# Patient Record
Sex: MALE | Race: BLACK OR AFRICAN AMERICAN | NOT HISPANIC OR LATINO | Employment: FULL TIME | URBAN - METROPOLITAN AREA
[De-identification: names, ages, dates, MRNs, and addresses within clinical notes are randomized per-mention and may not be internally consistent; named-entity substitution may affect disease eponyms.]

---

## 2019-03-25 ENCOUNTER — LAB (OUTPATIENT)
Dept: LAB | Facility: HOSPITAL | Age: 29
End: 2019-03-25

## 2019-03-25 ENCOUNTER — OFFICE VISIT (OUTPATIENT)
Dept: FAMILY MEDICINE CLINIC | Facility: CLINIC | Age: 29
End: 2019-03-25

## 2019-03-25 ENCOUNTER — HOSPITAL ENCOUNTER (OUTPATIENT)
Dept: GENERAL RADIOLOGY | Facility: HOSPITAL | Age: 29
Discharge: HOME | End: 2019-03-25
Admitting: INTERNAL MEDICINE

## 2019-03-25 VITALS
HEIGHT: 60 IN | DIASTOLIC BLOOD PRESSURE: 80 MMHG | RESPIRATION RATE: 14 BRPM | HEART RATE: 78 BPM | TEMPERATURE: 98.4 F | OXYGEN SATURATION: 97 % | SYSTOLIC BLOOD PRESSURE: 126 MMHG | BODY MASS INDEX: 27.17 KG/M2 | WEIGHT: 138.4 LBS

## 2019-03-25 DIAGNOSIS — R63.0 LACK OF APPETITE: Primary | ICD-10-CM

## 2019-03-25 DIAGNOSIS — M54.50 ACUTE MIDLINE LOW BACK PAIN WITHOUT SCIATICA: ICD-10-CM

## 2019-03-25 DIAGNOSIS — Z00.00 PREVENTATIVE HEALTH CARE: ICD-10-CM

## 2019-03-25 DIAGNOSIS — G89.29 CHRONIC PAIN OF RIGHT KNEE: ICD-10-CM

## 2019-03-25 DIAGNOSIS — M25.561 CHRONIC PAIN OF RIGHT KNEE: ICD-10-CM

## 2019-03-25 DIAGNOSIS — Z86.39 HISTORY OF IRON DEFICIENCY: ICD-10-CM

## 2019-03-25 LAB
25(OH)D3 SERPL-MCNC: 14.7 NG/ML
ALBUMIN SERPL-MCNC: 4.88 G/DL (ref 3.2–4.8)
ALBUMIN/GLOB SERPL: 2.4 G/DL (ref 1.5–2.5)
ALP SERPL-CCNC: 110 U/L (ref 25–100)
ALT SERPL W P-5'-P-CCNC: 17 U/L (ref 7–40)
ANION GAP SERPL CALCULATED.3IONS-SCNC: 6 MMOL/L (ref 3–11)
ARTICHOKE IGE QN: 109 MG/DL (ref 0–130)
AST SERPL-CCNC: 17 U/L (ref 0–33)
BILIRUB SERPL-MCNC: 1.1 MG/DL (ref 0.3–1.2)
BUN BLD-MCNC: 15 MG/DL (ref 9–23)
BUN/CREAT SERPL: 15 (ref 7–25)
CALCIUM SPEC-SCNC: 9.7 MG/DL (ref 8.7–10.4)
CHLORIDE SERPL-SCNC: 104 MMOL/L (ref 99–109)
CHOLEST SERPL-MCNC: 172 MG/DL (ref 0–200)
CO2 SERPL-SCNC: 30 MMOL/L (ref 20–31)
CREAT BLD-MCNC: 1 MG/DL (ref 0.6–1.3)
DEPRECATED RDW RBC AUTO: 44.4 FL (ref 37–54)
ERYTHROCYTE [DISTWIDTH] IN BLOOD BY AUTOMATED COUNT: 13.4 % (ref 11.3–14.5)
GFR SERPL CREATININE-BSD FRML MDRD: 109 ML/MIN/1.73
GLOBULIN UR ELPH-MCNC: 2 GM/DL
GLUCOSE BLD-MCNC: 95 MG/DL (ref 70–100)
HCT VFR BLD AUTO: 49.1 % (ref 38.9–50.9)
HDLC SERPL-MCNC: 55 MG/DL (ref 40–60)
HGB BLD-MCNC: 16.2 G/DL (ref 13.1–17.5)
IRON 24H UR-MRATE: 101 MCG/DL (ref 50–175)
IRON SATN MFR SERPL: 33 % (ref 20–50)
MCH RBC QN AUTO: 30.2 PG (ref 27–31)
MCHC RBC AUTO-ENTMCNC: 33 G/DL (ref 32–36)
MCV RBC AUTO: 91.4 FL (ref 80–99)
PLATELET # BLD AUTO: 192 10*3/MM3 (ref 150–450)
PMV BLD AUTO: 11.1 FL (ref 6–12)
POTASSIUM BLD-SCNC: 4.7 MMOL/L (ref 3.5–5.5)
PROT SERPL-MCNC: 6.9 G/DL (ref 5.7–8.2)
RBC # BLD AUTO: 5.37 10*6/MM3 (ref 4.2–5.76)
SODIUM BLD-SCNC: 140 MMOL/L (ref 132–146)
TIBC SERPL-MCNC: 310 MCG/DL (ref 250–450)
TRIGL SERPL-MCNC: 69 MG/DL (ref 0–150)
TSH SERPL DL<=0.05 MIU/L-ACNC: 0.37 MIU/ML (ref 0.35–5.35)
WBC NRBC COR # BLD: 6.14 10*3/MM3 (ref 3.5–10.8)

## 2019-03-25 PROCEDURE — 83550 IRON BINDING TEST: CPT | Performed by: INTERNAL MEDICINE

## 2019-03-25 PROCEDURE — 82306 VITAMIN D 25 HYDROXY: CPT | Performed by: INTERNAL MEDICINE

## 2019-03-25 PROCEDURE — 99204 OFFICE O/P NEW MOD 45 MIN: CPT | Performed by: INTERNAL MEDICINE

## 2019-03-25 PROCEDURE — 85027 COMPLETE CBC AUTOMATED: CPT | Performed by: INTERNAL MEDICINE

## 2019-03-25 PROCEDURE — 73560 X-RAY EXAM OF KNEE 1 OR 2: CPT

## 2019-03-25 PROCEDURE — 80053 COMPREHEN METABOLIC PANEL: CPT | Performed by: INTERNAL MEDICINE

## 2019-03-25 PROCEDURE — 84443 ASSAY THYROID STIM HORMONE: CPT | Performed by: INTERNAL MEDICINE

## 2019-03-25 PROCEDURE — 83540 ASSAY OF IRON: CPT | Performed by: INTERNAL MEDICINE

## 2019-03-25 PROCEDURE — 36415 COLL VENOUS BLD VENIPUNCTURE: CPT

## 2019-03-25 PROCEDURE — 80061 LIPID PANEL: CPT | Performed by: INTERNAL MEDICINE

## 2019-03-25 NOTE — PROGRESS NOTES
Chief Complaint:  Appetite, knee pain, back pain    HPI:  Sonny Goel is a 28 y.o. male who presents today for establish care and several acute complaints.  Patient reports lack of appetite over the last several months.  He has been trying to gain weight.  Plays basketball frequently and works out.  Usually 4 times per week.  He has no appetite the morning has difficulty eating large meals.  Reports sometimes nauseous when he tries to eat.  Reports injuring his knee playing basketball several months ago.  He denies any recurrent pain but states he has not sensation in his knee when he runs out.  He reports back pain since that time as well.The right-sided back pain worse with physical activity.  No better for the last couple months.  He has tried conservative therapy with over-the-counter medication.  He does not remember any specific muscle strain or his back.    ROS:  Constitutional: no fevers, night sweats or unexplained weight loss  Eyes: no vision changes  ENT: no runny nose, ear pain, sore throat  Cardio: no chest pain, palpitations  Pulm: no shortness of breath, wheezing, or cough  GI: no abdominal pain or changes in bowel movements  : no difficulty urinating  MSK: no difficulty ambulating, no joint pain  Neuro: no weakness, dizziness or headache  Psych: no trouble sleeping  Endo: Lack of appetite      Past Medical History:   Diagnosis Date   • Anemia     low iron       Family History   Problem Relation Age of Onset   • Breast cancer Neg Hx    • Diabetes Neg Hx    • Heart attack Neg Hx    • Hypertension Neg Hx    • Migraines Neg Hx    • Stroke Neg Hx       Social History     Socioeconomic History   • Marital status: Single     Spouse name: Not on file   • Number of children: Not on file   • Years of education: Not on file   • Highest education level: Not on file   Tobacco Use   • Smoking status: Never Smoker   • Smokeless tobacco: Never Used   Substance and Sexual Activity   • Alcohol use: Yes      Comment: once monthly    • Drug use: No   • Sexual activity: Yes     Partners: Female     Birth control/protection: Condom      No Known Allergies     There is no immunization history on file for this patient.     PE:  Vitals:    03/25/19 1051   BP: 126/80   Pulse: 78   Resp: 14   Temp: 98.4 °F (36.9 °C)   SpO2: 97%        Gen Appearance: NAD  HEENT: Normocephalic, PERRLA, no thyromegaly, trache midline  Heart: RRR, normal S1 and S2, no murmur  Lungs: CTA b/l, no wheezing, no crackles  Abdomen: Soft, non-tender, non-distended, no guarding and BSx4  MSK: Moves all extremities well, normal gait, no peripheral edema, crepitus right knee, negative drawer test bilaterally, no pain to palpation bilateral knees  Pulses: Palpable and equal b/l  Lymph nodes: No palpable lymphadenopathy   Neuro: No focal deficits      No current outpatient medications on file.     No current facility-administered medications for this visit.         Sonny was seen today for establish care.    Diagnoses and all orders for this visit:    Lack of appetite  Checking blood work.  If all normal will refer to nutritionist.  Preventative health care  -     CBC (No Diff); Future  -     Comprehensive Metabolic Panel; Future  -     Lipid Panel; Future  -     Vitamin D 25 Hydroxy; Future  -     TSH Rfx On Abnormal To Free T4; Future    History of iron deficiency  -     Iron and TIBC; Future  History of iron deficiency previously prescribed iron supplements.  He currently takes these intermittently.  Has not had blood work done in many years.  Chronic pain of right knee  -     XR Knee 1 or 2 View Right; Future  Checking x-ray of the knee with trauma approximately 1 month ago.  Crepitus on exam but otherwise normal.  Full range of motion and no pain with activity.  Acute midline low back pain without sciatica  -     Ambulatory Referral to Physical Therapy Evaluate and treat  Will refer to PT for ongoing back pain.  No sciatica.  Appears to be  musculoskeletal right-sided low back pain based on exam and presentation.       Return in about 1 year (around 3/25/2020).

## 2020-02-13 ENCOUNTER — HOSPITAL ENCOUNTER (EMERGENCY)
Facility: HOSPITAL | Age: 30
Discharge: HOME | End: 2020-02-14
Attending: EMERGENCY MEDICINE | Admitting: EMERGENCY MEDICINE

## 2020-02-13 DIAGNOSIS — R11.2 NON-INTRACTABLE VOMITING WITH NAUSEA, UNSPECIFIED VOMITING TYPE: ICD-10-CM

## 2020-02-13 DIAGNOSIS — J10.1 INFLUENZA A: Primary | ICD-10-CM

## 2020-02-13 PROCEDURE — 86308 HETEROPHILE ANTIBODY SCREEN: CPT | Performed by: EMERGENCY MEDICINE

## 2020-02-13 PROCEDURE — 85025 COMPLETE CBC W/AUTO DIFF WBC: CPT | Performed by: EMERGENCY MEDICINE

## 2020-02-13 PROCEDURE — 80053 COMPREHEN METABOLIC PANEL: CPT | Performed by: EMERGENCY MEDICINE

## 2020-02-13 PROCEDURE — 87804 INFLUENZA ASSAY W/OPTIC: CPT | Performed by: EMERGENCY MEDICINE

## 2020-02-13 PROCEDURE — 99283 EMERGENCY DEPT VISIT LOW MDM: CPT

## 2020-02-13 PROCEDURE — 83690 ASSAY OF LIPASE: CPT | Performed by: EMERGENCY MEDICINE

## 2020-02-13 PROCEDURE — 96374 THER/PROPH/DIAG INJ IV PUSH: CPT

## 2020-02-13 RX ORDER — ONDANSETRON 2 MG/ML
4 INJECTION INTRAMUSCULAR; INTRAVENOUS
Status: DISCONTINUED | OUTPATIENT
Start: 2020-02-13 | End: 2020-02-14 | Stop reason: HOSPADM

## 2020-02-13 RX ORDER — SODIUM CHLORIDE 0.9 % (FLUSH) 0.9 %
10 SYRINGE (ML) INJECTION AS NEEDED
Status: DISCONTINUED | OUTPATIENT
Start: 2020-02-13 | End: 2020-02-14 | Stop reason: HOSPADM

## 2020-02-14 VITALS
HEIGHT: 72 IN | DIASTOLIC BLOOD PRESSURE: 76 MMHG | BODY MASS INDEX: 18.28 KG/M2 | RESPIRATION RATE: 18 BRPM | HEART RATE: 81 BPM | WEIGHT: 135 LBS | TEMPERATURE: 99.8 F | SYSTOLIC BLOOD PRESSURE: 119 MMHG | OXYGEN SATURATION: 97 %

## 2020-02-14 LAB
ALBUMIN SERPL-MCNC: 4.3 G/DL (ref 3.5–5.2)
ALBUMIN/GLOB SERPL: 1.3 G/DL
ALP SERPL-CCNC: 69 U/L (ref 39–117)
ALT SERPL W P-5'-P-CCNC: 12 U/L (ref 1–41)
ANION GAP SERPL CALCULATED.3IONS-SCNC: 12 MMOL/L (ref 5–15)
AST SERPL-CCNC: 21 U/L (ref 1–40)
BASOPHILS # BLD AUTO: 0.01 10*3/MM3 (ref 0–0.2)
BASOPHILS NFR BLD AUTO: 0.2 % (ref 0–1.5)
BILIRUB SERPL-MCNC: 0.5 MG/DL (ref 0.2–1.2)
BUN BLD-MCNC: 10 MG/DL (ref 6–20)
BUN/CREAT SERPL: 10.4 (ref 7–25)
CALCIUM SPEC-SCNC: 9.3 MG/DL (ref 8.6–10.5)
CHLORIDE SERPL-SCNC: 101 MMOL/L (ref 98–107)
CO2 SERPL-SCNC: 25 MMOL/L (ref 22–29)
CREAT BLD-MCNC: 0.96 MG/DL (ref 0.76–1.27)
DEPRECATED RDW RBC AUTO: 40 FL (ref 37–54)
EOSINOPHIL # BLD AUTO: 0.14 10*3/MM3 (ref 0–0.4)
EOSINOPHIL NFR BLD AUTO: 2.3 % (ref 0.3–6.2)
ERYTHROCYTE [DISTWIDTH] IN BLOOD BY AUTOMATED COUNT: 12.3 % (ref 12.3–15.4)
FLUAV AG NPH QL: POSITIVE
FLUBV AG NPH QL IA: NEGATIVE
GFR SERPL CREATININE-BSD FRML MDRD: 114 ML/MIN/1.73
GLOBULIN UR ELPH-MCNC: 3.4 GM/DL
GLUCOSE BLD-MCNC: 106 MG/DL (ref 65–99)
HCT VFR BLD AUTO: 48.6 % (ref 37.5–51)
HETEROPH AB SER QL LA: NEGATIVE
HGB BLD-MCNC: 16.1 G/DL (ref 13–17.7)
IMM GRANULOCYTES # BLD AUTO: 0.02 10*3/MM3 (ref 0–0.05)
IMM GRANULOCYTES NFR BLD AUTO: 0.3 % (ref 0–0.5)
LIPASE SERPL-CCNC: 16 U/L (ref 13–60)
LYMPHOCYTES # BLD AUTO: 0.7 10*3/MM3 (ref 0.7–3.1)
LYMPHOCYTES NFR BLD AUTO: 11.6 % (ref 19.6–45.3)
MCH RBC QN AUTO: 29.4 PG (ref 26.6–33)
MCHC RBC AUTO-ENTMCNC: 33.1 G/DL (ref 31.5–35.7)
MCV RBC AUTO: 88.8 FL (ref 79–97)
MONOCYTES # BLD AUTO: 0.8 10*3/MM3 (ref 0.1–0.9)
MONOCYTES NFR BLD AUTO: 13.2 % (ref 5–12)
NEUTROPHILS # BLD AUTO: 4.38 10*3/MM3 (ref 1.7–7)
NEUTROPHILS NFR BLD AUTO: 72.4 % (ref 42.7–76)
NRBC BLD AUTO-RTO: 0 /100 WBC (ref 0–0.2)
PLATELET # BLD AUTO: 142 10*3/MM3 (ref 140–450)
PMV BLD AUTO: 10.3 FL (ref 6–12)
POTASSIUM BLD-SCNC: 3.7 MMOL/L (ref 3.5–5.2)
PROT SERPL-MCNC: 7.7 G/DL (ref 6–8.5)
RBC # BLD AUTO: 5.47 10*6/MM3 (ref 4.14–5.8)
SODIUM BLD-SCNC: 138 MMOL/L (ref 136–145)
WBC NRBC COR # BLD: 6.05 10*3/MM3 (ref 3.4–10.8)

## 2020-02-14 PROCEDURE — 25010000002 ONDANSETRON PER 1 MG: Performed by: EMERGENCY MEDICINE

## 2020-02-14 PROCEDURE — 96374 THER/PROPH/DIAG INJ IV PUSH: CPT

## 2020-02-14 PROCEDURE — 93005 ELECTROCARDIOGRAM TRACING: CPT

## 2020-02-14 RX ORDER — IBUPROFEN 600 MG/1
600 TABLET ORAL EVERY 6 HOURS PRN
Qty: 20 TABLET | Refills: 0 | Status: SHIPPED | OUTPATIENT
Start: 2020-02-14 | End: 2022-02-21

## 2020-02-14 RX ORDER — ONDANSETRON 4 MG/1
4 TABLET, ORALLY DISINTEGRATING ORAL 4 TIMES DAILY PRN
Qty: 15 TABLET | Refills: 0 | Status: SHIPPED | OUTPATIENT
Start: 2020-02-14 | End: 2021-06-23

## 2020-02-14 RX ADMIN — SODIUM CHLORIDE 1000 ML: 9 INJECTION, SOLUTION INTRAVENOUS at 00:00

## 2020-02-14 RX ADMIN — ONDANSETRON 4 MG: 2 INJECTION INTRAMUSCULAR; INTRAVENOUS at 00:01

## 2020-02-14 NOTE — ED PROVIDER NOTES
EMERGENCY DEPARTMENT ENCOUNTER      Pt Name: Sonny Goel  MRN: [id number]  YOB: 1990  Date of evaluation: 2/13/2020  Provider: Aureliano Romero DO    CHIEF COMPLAINT       Chief Complaint   Patient presents with   • Vomiting         HISTORY OF PRESENT ILLNESS  (Location/Symptom, Timing/Onset, Context/Setting, Quality, Duration, Modifying Factors, Severity.)   Sonny Goel is a 29 y.o. male who presents to the emergency department complaining of flu-like symptoms that began two days prior to arrival. The patient endorses vomiting, nausea, loss of appetite, body aches, chills, fever, generalized weakness, fatigue, cough, headache, and chest pain. He denies diarrhea. The patient reports only vomiting once today because he has not been eating much. Mr. Goel did not receive the influenza vaccine this year. He endorses sick contacts at his workplace. He takes no daily medication. He denies smoking and occasionally drinks alcohol. Mr. Goel lists no other acute symptoms at this time.     Nursing notes were reviewed.    REVIEW OF SYSTEMS    (2-9 systems for level 4, 10 or more for level 5)   ROS:  General:  +Body aches, +chills, +fever, +generalized weakness, +fatigue, +decreased appetite  Cardiovascular: No palpitations  Respiratory:  +Cough, no wheezing  Gastrointestinal:  No pain, +nausea, +vomiting, no diarrhea  Musculoskeletal:  No muscle pain, no joint pain, +chest pain  Skin:  No rash, no easy bruising  Neurologic:  No speech problems, no headache, no extremity numbness, no extremity tingling, no extremity weakness  Psychiatric:  No anxiety  Genitourinary:  No dysuria, no hematuria    Except as noted above the remainder of the review of systems was reviewed and negative.       PAST MEDICAL HISTORY     Past Medical History:   Diagnosis Date   • Anemia     low iron          SURGICAL HISTORY     History reviewed. No pertinent surgical history.      CURRENT MEDICATIONS    "    Current Facility-Administered Medications:   •  ondansetron (ZOFRAN) injection 4 mg, 4 mg, Intravenous, Q30 Min PRN, Aureliano Romero DO, 4 mg at 02/14/20 0001  •  [COMPLETED] Insert peripheral IV, , , Once **AND** sodium chloride 0.9 % flush 10 mL, 10 mL, Intravenous, PRN, Aureliano Romero DO    Current Outpatient Medications:   •  ibuprofen (ADVIL,MOTRIN) 600 MG tablet, Take 1 tablet by mouth Every 6 (Six) Hours As Needed for Mild Pain ., Disp: 20 tablet, Rfl: 0  •  ondansetron ODT (ZOFRAN-ODT) 4 MG disintegrating tablet, Take 1 tablet by mouth 4 (Four) Times a Day As Needed for Nausea or Vomiting., Disp: 15 tablet, Rfl: 0    ALLERGIES     Patient has no known allergies.    FAMILY HISTORY       Family History   Problem Relation Age of Onset   • Breast cancer Neg Hx    • Diabetes Neg Hx    • Heart attack Neg Hx    • Hypertension Neg Hx    • Migraines Neg Hx    • Stroke Neg Hx           SOCIAL HISTORY       Social History     Socioeconomic History   • Marital status: Single     Spouse name: Not on file   • Number of children: Not on file   • Years of education: Not on file   • Highest education level: Not on file   Tobacco Use   • Smoking status: Never Smoker   • Smokeless tobacco: Never Used   Substance and Sexual Activity   • Alcohol use: Yes     Comment: socially   • Drug use: No   • Sexual activity: Yes     Partners: Female     Birth control/protection: Condom         PHYSICAL EXAM    (up to 7 for level 4, 8 or more for level 5)     Vitals:    02/13/20 2155 02/14/20 0000   BP: 134/86 125/76   BP Location: Left arm    Patient Position: Sitting    Pulse: 112 87   Resp: 18    Temp: 99.8 °F (37.7 °C)    TempSrc: Oral    SpO2: 96% 97%   Weight: 61.2 kg (135 lb)    Height: 182.9 cm (72\")        Physical Exam  General :Patient is awake, alert, oriented, in no acute distress, nontoxic appearing  HEENT: Pupils are equally round and reactive to light, EOMI, conjunctivae clear, sclerae white, there is no " injection no icterus.  Oral mucosa is moist, no exudate. Uvula is midline  Neck: Neck is supple, full range of motion, trachea midline  Cardiac: Heart regular rate, rhythm, no murmurs, rubs, or gallops  Lungs: Lungs are clear to auscultation, there is no wheezing, rhonchi, or rales. There is no use of accessory muscles.  Chest wall: There is no tenderness to palpation over the chest wall or over ribs  Abdomen: Abdomen is soft, nontender, nondistended. There is no firm or pulsatile masses, no rebound rigidity or guarding.   Musculoskeletal: 5 out of 5 strength in all 4 extremities.  No focal muscle deficits are appreciated  Neuro: Motor intact, sensory intact, level of consciousness is normal, GCS 15, no meningeal signs  Dermatology: Skin is warm and dry, no rash noted  Psych: Mentation is grossly normal, cognition is grossly normal. Affect is appropriate.      DIAGNOSTIC RESULTS     EKG: All EKG's are interpreted by the Emergency Department Physician who either signs or Co-signs this chart in the absence of a cardiologist.    ECG 12 Lead   Final Result   Test Reason : chest pain   Blood Pressure : **/** mmHG   Vent. Rate : 077 BPM     Atrial Rate : 077 BPM      P-R Int : 144 ms          QRS Dur : 090 ms       QT Int : 356 ms       P-R-T Axes : 082 098 069 degrees      QTc Int : 402 ms      Normal sinus rhythm   Rightward axis   Borderline ECG   No previous ECGs available   Confirmed by DENYS READ MD ([id number]) on 2/14/2020 12:41:13 AM      Referred By:  edmd           Confirmed By:DENYS READ MD          RADIOLOGY:   Non-plain film images such as CT, Ultrasound and MRI are read by the radiologist. Plain radiographic images are visualized and preliminarily interpreted by the emergency physician with the below findings:      [] Radiologist's Report Reviewed:  No orders to display         ED BEDSIDE ULTRASOUND:   Performed by ED Physician - none    LABS:    I have reviewed and interpreted all of the currently  available lab results from this visit (if applicable):  Results for orders placed or performed during the hospital encounter of 02/13/20   Influenza Antigen, Rapid - Swab, Nasopharynx   Result Value Ref Range    Influenza A Ag, EIA Positive (A) Negative    Influenza B Ag, EIA Negative Negative   Comprehensive Metabolic Panel   Result Value Ref Range    Glucose 106 (H) 65 - 99 mg/dL    BUN 10 6 - 20 mg/dL    Creatinine 0.96 0.76 - 1.27 mg/dL    Sodium 138 136 - 145 mmol/L    Potassium 3.7 3.5 - 5.2 mmol/L    Chloride 101 98 - 107 mmol/L    CO2 25.0 22.0 - 29.0 mmol/L    Calcium 9.3 8.6 - 10.5 mg/dL    Total Protein 7.7 6.0 - 8.5 g/dL    Albumin 4.30 3.50 - 5.20 g/dL    ALT (SGPT) 12 1 - 41 U/L    AST (SGOT) 21 1 - 40 U/L    Alkaline Phosphatase 69 39 - 117 U/L    Total Bilirubin 0.5 0.2 - 1.2 mg/dL    eGFR  African Amer 114 >60 mL/min/1.73    Globulin 3.4 gm/dL    A/G Ratio 1.3 g/dL    BUN/Creatinine Ratio 10.4 7.0 - 25.0    Anion Gap 12.0 5.0 - 15.0 mmol/L   Lipase   Result Value Ref Range    Lipase 16 13 - 60 U/L   Mononucleosis Screen   Result Value Ref Range    Monospot Negative Negative   CBC Auto Differential   Result Value Ref Range    WBC 6.05 3.40 - 10.80 10*3/mm3    RBC 5.47 4.14 - 5.80 10*6/mm3    Hemoglobin 16.1 13.0 - 17.7 g/dL    Hematocrit 48.6 37.5 - 51.0 %    MCV 88.8 79.0 - 97.0 fL    MCH 29.4 26.6 - 33.0 pg    MCHC 33.1 31.5 - 35.7 g/dL    RDW 12.3 12.3 - 15.4 %    RDW-SD 40.0 37.0 - 54.0 fl    MPV 10.3 6.0 - 12.0 fL    Platelets 142 140 - 450 10*3/mm3    Neutrophil % 72.4 42.7 - 76.0 %    Lymphocyte % 11.6 (L) 19.6 - 45.3 %    Monocyte % 13.2 (H) 5.0 - 12.0 %    Eosinophil % 2.3 0.3 - 6.2 %    Basophil % 0.2 0.0 - 1.5 %    Immature Grans % 0.3 0.0 - 0.5 %    Neutrophils, Absolute 4.38 1.70 - 7.00 10*3/mm3    Lymphocytes, Absolute 0.70 0.70 - 3.10 10*3/mm3    Monocytes, Absolute 0.80 0.10 - 0.90 10*3/mm3    Eosinophils, Absolute 0.14 0.00 - 0.40 10*3/mm3    Basophils, Absolute 0.01 0.00 - 0.20  "10*3/mm3    Immature Grans, Absolute 0.02 0.00 - 0.05 10*3/mm3    nRBC 0.0 0.0 - 0.2 /100 WBC        All other labs were within normal range or not returned as of this dictation.      EMERGENCY DEPARTMENT COURSE and DIFFERENTIAL DIAGNOSIS/MDM:   Vitals:    Vitals:    02/13/20 2155 02/14/20 0000   BP: 134/86 125/76   BP Location: Left arm    Patient Position: Sitting    Pulse: 112 87   Resp: 18    Temp: 99.8 °F (37.7 °C)    TempSrc: Oral    SpO2: 96% 97%   Weight: 61.2 kg (135 lb)    Height: 182.9 cm (72\")         !    Patient with viral type prodrome her last couple days.  Initially tachycardic, low-grade temperature on arrival, no meningeal signs.  Patient was given IV, fluids, influenza A is positive.  I discussed these results with the patient, he is feeling better, heart rate is improved after IV fluids.  We discussed symptomatic therapies, discussed pros and cons of Tamiflu medication, patient went to avoid it at this time.  We discussed continuing with symptomatic therapies, good fluid hydration, droplet precautions, close follow-up with his PCP. The patient will follow-up with their PCP in 1-2 days for reevaluation.  If the patient or family members have any further concerns or patient has any worsening symptoms they will return to the ED for reevaluation.      MEDICATIONS ADMINISTERED IN ED:  Medications   sodium chloride 0.9 % flush 10 mL (has no administration in time range)   ondansetron (ZOFRAN) injection 4 mg (4 mg Intravenous Given 2/14/20 0001)   sodium chloride 0.9 % bolus 1,000 mL (0 mL Intravenous Stopped 2/14/20 0043)       PROCEDURES:  Procedures    CRITICAL CARE TIME    Total Critical Care time was 0 minutes, excluding separately reportable procedures.   There was a high probability of clinically significant/life threatening deterioration in the patient's condition which required my urgent intervention.      FINAL IMPRESSION      1. Influenza A    2. Non-intractable vomiting with nausea, " unspecified vomiting type          DISPOSITION/PLAN     ED Disposition     ED Disposition Condition Comment    Discharge Stable           PATIENT REFERRED TO:  Mateo Barrera DO  [address] [address]  [phone]    In 2 days      [facility]  [address] [facility] [address] [phone]    If symptoms worsen      DISCHARGE MEDICATIONS:     Medication List      START taking these medications    ibuprofen 600 MG tablet  Commonly known as:  ADVIL,MOTRIN  Take 1 tablet by mouth Every 6 (Six) Hours As Needed for Mild Pain .     ondansetron ODT 4 MG disintegrating tablet  Commonly known as:  ZOFRAN-ODT  Take 1 tablet by mouth 4 (Four) Times a Day As Needed for Nausea or   Vomiting.            Documentation assistance provided by Scar Molina acting as scribe for Dr. Aureliano Romero.     The scribe's documentation has been prepared under my direction and personally reviewed by me in its entirety.  I confirm that the note above accurately reflects all work, treatment, procedures, and medical decision making performed by me.      Comment: Please note this report has been produced using speech recognition software.      Aureliano Romero DO  Attending Emergency Physician                 Scar Molina  02/14/20 0013       Aureliano Romero DO  02/14/20 0055

## 2020-07-29 ENCOUNTER — OFFICE VISIT (OUTPATIENT)
Dept: ORTHOPEDIC SURGERY | Facility: CLINIC | Age: 30
End: 2020-07-29

## 2020-07-29 VITALS — WEIGHT: 148.2 LBS | BODY MASS INDEX: 20.07 KG/M2 | HEART RATE: 85 BPM | OXYGEN SATURATION: 99 % | HEIGHT: 72 IN

## 2020-07-29 DIAGNOSIS — M79.604 RIGHT LEG PAIN: Primary | ICD-10-CM

## 2020-07-29 DIAGNOSIS — S76.111A STRAIN OF RIGHT QUADRICEPS, INITIAL ENCOUNTER: ICD-10-CM

## 2020-07-29 PROCEDURE — 99203 OFFICE O/P NEW LOW 30 MIN: CPT | Performed by: ORTHOPAEDIC SURGERY

## 2020-07-29 NOTE — PROGRESS NOTES
[facility] Note    Subjective     Chief Complaint   Patient presents with   • Right Leg - Pain        HPI    Sonny Goel is a 29 y.o. male who presents with right femur pain.  Onset: injured while playing soccer. The issue has been ongoing for 3 day(s). Pain is a 9/10 on the pain scale. Pain is described as aching, throbbing, stabbing and shooting. Associated symptoms include pain, swelling and stiffness. The pain is worse with bending the knee; resting and ice improve the pain. Previous treatments have included: NSAIDS.    I have reviewed the following portions of the patient's history:History of Present Illness    He injured his right quadriceps muscle playing soccer 3 days ago.  He works at [employer].      Past Medical History:   Diagnosis Date   • Anemia     low iron       History reviewed. No pertinent surgical history.   Family History   Problem Relation Age of Onset   • Breast cancer Neg Hx    • Diabetes Neg Hx    • Heart attack Neg Hx    • Hypertension Neg Hx    • Migraines Neg Hx    • Stroke Neg Hx      Social History     Socioeconomic History   • Marital status: Single     Spouse name: Not on file   • Number of children: Not on file   • Years of education: Not on file   • Highest education level: Not on file   Tobacco Use   • Smoking status: Never Smoker   • Smokeless tobacco: Never Used   Substance and Sexual Activity   • Alcohol use: Yes     Comment: socially   • Drug use: No   • Sexual activity: Yes     Partners: Female     Birth control/protection: Condom      Current Outpatient Medications on File Prior to Visit   Medication Sig Dispense Refill   • ibuprofen (ADVIL,MOTRIN) 600 MG tablet Take 1 tablet by mouth Every 6 (Six) Hours As Needed for Mild Pain . 20 tablet 0   • ondansetron ODT (ZOFRAN-ODT) 4 MG disintegrating tablet Take 1 tablet by mouth 4 (Four) Times a Day As Needed for Nausea or Vomiting. 15 tablet 0     No current facility-administered medications on file  "prior to visit.       No Known Allergies     The following portions of the patient's history were reviewed and updated as appropriate: allergies, current medications, past family history, past medical history, past social history, past surgical history and problem list.    Review of Systems   Constitutional: Negative.    HENT: Negative.    Eyes: Negative.    Respiratory: Negative.    Cardiovascular: Positive for leg swelling.   Gastrointestinal: Negative.    Endocrine: Negative.    Genitourinary: Negative.    Musculoskeletal: Positive for arthralgias.   Skin: Negative.    Allergic/Immunologic: Negative.    Neurological: Negative.    Hematological: Negative.    Psychiatric/Behavioral: Negative.         Objective      Physical Exam  Pulse 85   Ht 182.9 cm (72.01\")   Wt 67.2 kg (148 lb 3.2 oz)   SpO2 99%   BMI 20.10 kg/m²     Body mass index is 20.1 kg/m².    GENERAL APPEARANCE: awake, alert & oriented x 3, in no acute distress and well developed, well nourished  PSYCH: normal mood and affect  LUNGS:  breathing nonlabored, no wheezing  EYES: sclera anicteric, pupils equal  CARDIOVASCULAR: palpable pulses dorsalis pedis, palpable posterior tibial bilaterally. Capillary refill less than 2 seconds  INTEGUMENTARY: skin intact, no clubbing, cyanosis  NEUROLOGIC:  Normal Sensation and reflexes       Ortho Exam  Peripheral Vascular:    Upper Extremity:   Inspection:  Left--no cyanotic nail beds Right--no cyanotic nail beds   Bilateral:  Pink nail beds with brisk capillary refill   Palpation:  Bilateral radial pulse normal  Musculoskeletal:  Global Assessment:  Overall assessment of Lower Extremity Muscle Strength and Tone:  Right quadriceps--5/5  Right hamstrings--5/5  Right tibialis anterior--5/5  Right gastroc soleus--5/5  Right EHL--5/5  Lower Extremity:  Knee/Patella:  No digital clubbing or cyanosis.    Examination of right knee reveals:  Normal deep tendon reflexes, coordination, strength, tone, sensation.  No known " fractures or deformities.  Inspection and Palpation:    Right knee:  Tenderness: Quadriceps muscle with quadriceps muscle swelling  Effusion:  none  Crepitus:  none  Pulses:  2+  Ecchymosis:  None  Warmth:  None   ROM:  Right:  Extension:0    Flexion:135  Left:  Extension:0     Flexion:135  Instability:  Right:  Lachman Test:  Negative, Varus stress test negative,   Valgus stress test negative, Posterior Drawer Test:  Negative  Deformities/Malalignments/Discrepancies:    Left:  none  Right:  none  Functional Testing:  Right:  Deanna's test:  Negative  Patella grind test:  Negative  Q-angle:  Normal  Apprehension Sign:  Negative        Imaging/Studies  Imaging Results (Last 7 Days)     Procedure Component Value Units Date/Time    XR Knee 3+ View With Foraker Right [[id number]] Resulted:  07/29/20 0926     Updated:  07/29/20 0927    Narrative:       Knee X-Ray  Indication: Pain    Upright AP  Lateral,  views of right knee     Findings:  No fracture  No bony lesion  Normal soft tissues  Normal joint spaces    No prior studies were available for comparison.            Assessment/Plan        ICD-10-CM ICD-9-CM   1. Right leg pain M79.604 729.5   2. Strain of right quadriceps, initial encounter S76.111A 843.8       Orders Placed This Encounter   Procedures   • XR Knee 3+ View With Foraker Right   • Ambulatory Referral to Physical Therapy      He strained his right quadriceps.  He will go to [facility].  I will see him back in 3 weeks.  He is working full duty at [employer].  Medical Decision Making  Management Options : over-the-counter medicine and physical/occupational therapy  Data/Risk: radiology tests and independent visualization of imaging, lab tests, or EMG/NCV    Miguel Angel Cortez MD  07/29/20  09:33         EMR Dragon/Transcription disclaimer:  Much of this encounter note is an electronic transcription of spoken language to printed text. Electronic transcription of spoken language may permit erroneous,  or at times, nonsensical words or phrases to be inadvertently transcribed. Although I have reviewed the note for such errors, some may still exist.

## 2020-08-19 ENCOUNTER — OFFICE VISIT (OUTPATIENT)
Dept: ORTHOPEDIC SURGERY | Facility: CLINIC | Age: 30
End: 2020-08-19

## 2020-08-19 VITALS — OXYGEN SATURATION: 98 % | HEART RATE: 73 BPM | BODY MASS INDEX: 20.05 KG/M2 | HEIGHT: 72 IN | WEIGHT: 148 LBS

## 2020-08-19 DIAGNOSIS — S76.111D STRAIN OF RIGHT QUADRICEPS, SUBSEQUENT ENCOUNTER: Primary | ICD-10-CM

## 2020-08-19 PROCEDURE — 99212 OFFICE O/P EST SF 10 MIN: CPT | Performed by: ORTHOPAEDIC SURGERY

## 2020-08-19 NOTE — PROGRESS NOTES
[facility] [place] [facility]opaedic Surgery Clinic Note    Subjective     Chief Complaint   Patient presents with   • Follow-up     3 weeks follow up for Strain of right quadriceps        HPI  Sonny Goel is a 29 y.o. male.  He is doing much better.  He is working full duty.  He is wearing the brace.  He has no pain.    Past Medical History:   Diagnosis Date   • Anemia     low iron       No past surgical history on file.   Family History   Problem Relation Age of Onset   • Breast cancer Neg Hx    • Diabetes Neg Hx    • Heart attack Neg Hx    • Hypertension Neg Hx    • Migraines Neg Hx    • Stroke Neg Hx      Social History     Socioeconomic History   • Marital status: Single     Spouse name: Not on file   • Number of children: Not on file   • Years of education: Not on file   • Highest education level: Not on file   Tobacco Use   • Smoking status: Never Smoker   • Smokeless tobacco: Never Used   Substance and Sexual Activity   • Alcohol use: Yes     Comment: socially   • Drug use: No   • Sexual activity: Yes     Partners: Female     Birth control/protection: Condom      Current Outpatient Medications on File Prior to Visit   Medication Sig Dispense Refill   • ibuprofen (ADVIL,MOTRIN) 600 MG tablet Take 1 tablet by mouth Every 6 (Six) Hours As Needed for Mild Pain . 20 tablet 0   • ondansetron ODT (ZOFRAN-ODT) 4 MG disintegrating tablet Take 1 tablet by mouth 4 (Four) Times a Day As Needed for Nausea or Vomiting. 15 tablet 0     No current facility-administered medications on file prior to visit.       No Known Allergies     The following portions of the patient's history were reviewed and updated as appropriate: allergies, current medications, past family history, past medical history, past social history, past surgical history and problem list.    Review of Systems   Constitutional: Negative.    HENT: Negative.    Eyes: Negative.    Respiratory: Negative.    Cardiovascular: Positive for leg swelling.   Gastrointestinal:  "Negative.    Endocrine: Negative.    Genitourinary: Negative.    Musculoskeletal: Positive for arthralgias and joint swelling.   Skin: Negative.    Allergic/Immunologic: Negative.    Neurological: Negative.    Hematological: Negative.    Psychiatric/Behavioral: Negative.         Objective      Physical Exam  Pulse 73   Ht 182.9 cm (72.01\")   Wt 67.1 kg (148 lb)   SpO2 98%   BMI 20.07 kg/m²     Body mass index is 20.07 kg/m².    GENERAL APPEARANCE: awake, alert & oriented x 3, in no acute distress and well developed, well nourished  PSYCH: normal mood and affect  LUNGS:  breathing nonlabored, no wheezing  Right knee has full motion strength.  No deficit.  No tenderness.  Imaging/Studies  Imaging Results (Last 7 Days)     ** No results found for the last 168 hours. **          Assessment/Plan        ICD-10-CM ICD-9-CM   1. Strain of right quadriceps, subsequent encounter S76.111D V58.89     843.8     He is doing very well.  He will wean out of his brace over the next 4 to 6 weeks.  He is working full duty.  He will follow-up as needed.  Medical Decision Making  Management Options : over-the-counter medicine      Miguel Angel Cortez MD  08/19/20  14:39         EMR Dragon/Transcription disclaimer:  Much of this encounter note is an electronic transcription of spoken language to printed text. Electronic transcription of spoken language may permit erroneous, or at times, nonsensical words or phrases to be inadvertently transcribed. Although I have reviewed the note for such errors, some may still exist.      "

## 2021-05-18 ENCOUNTER — HOSPITAL ENCOUNTER (EMERGENCY)
Facility: HOSPITAL | Age: 31
Discharge: HOME | End: 2021-05-18
Attending: EMERGENCY MEDICINE | Admitting: EMERGENCY MEDICINE

## 2021-05-18 ENCOUNTER — APPOINTMENT (OUTPATIENT)
Dept: GENERAL RADIOLOGY | Facility: HOSPITAL | Age: 31
End: 2021-05-18

## 2021-05-18 VITALS
HEIGHT: 72 IN | SYSTOLIC BLOOD PRESSURE: 133 MMHG | RESPIRATION RATE: 18 BRPM | BODY MASS INDEX: 21.67 KG/M2 | HEART RATE: 91 BPM | DIASTOLIC BLOOD PRESSURE: 74 MMHG | WEIGHT: 160 LBS | TEMPERATURE: 98.7 F | OXYGEN SATURATION: 97 %

## 2021-05-18 DIAGNOSIS — S83.411A SPRAIN OF MEDIAL COLLATERAL LIGAMENT OF RIGHT KNEE, INITIAL ENCOUNTER: Primary | ICD-10-CM

## 2021-05-18 PROCEDURE — 99283 EMERGENCY DEPT VISIT LOW MDM: CPT

## 2021-05-18 PROCEDURE — 25010000002 DEXAMETHASONE PER 1 MG: Performed by: NURSE PRACTITIONER

## 2021-05-18 PROCEDURE — 96372 THER/PROPH/DIAG INJ SC/IM: CPT

## 2021-05-18 PROCEDURE — 73560 X-RAY EXAM OF KNEE 1 OR 2: CPT

## 2021-05-18 RX ORDER — CYCLOBENZAPRINE HCL 10 MG
10 TABLET ORAL 3 TIMES DAILY PRN
Qty: 15 TABLET | Refills: 0 | Status: SHIPPED | OUTPATIENT
Start: 2021-05-18 | End: 2022-02-21

## 2021-05-18 RX ORDER — CYCLOBENZAPRINE HCL 10 MG
10 TABLET ORAL ONCE
Status: COMPLETED | OUTPATIENT
Start: 2021-05-18 | End: 2021-05-18

## 2021-05-18 RX ORDER — DICLOFENAC SODIUM 75 MG/1
75 TABLET, DELAYED RELEASE ORAL 2 TIMES DAILY
Qty: 20 TABLET | Refills: 0 | Status: SHIPPED | OUTPATIENT
Start: 2021-05-18 | End: 2021-06-23

## 2021-05-18 RX ORDER — DEXAMETHASONE SODIUM PHOSPHATE 10 MG/ML
10 INJECTION INTRAMUSCULAR; INTRAVENOUS ONCE
Status: COMPLETED | OUTPATIENT
Start: 2021-05-18 | End: 2021-05-18

## 2021-05-18 RX ADMIN — CYCLOBENZAPRINE HYDROCHLORIDE 10 MG: 10 TABLET, FILM COATED ORAL at 13:52

## 2021-05-18 RX ADMIN — DEXAMETHASONE SODIUM PHOSPHATE 10 MG: 10 INJECTION INTRAMUSCULAR; INTRAVENOUS at 13:52

## 2021-05-19 ENCOUNTER — OFFICE VISIT (OUTPATIENT)
Dept: ORTHOPEDIC SURGERY | Facility: CLINIC | Age: 31
End: 2021-05-19

## 2021-05-19 VITALS
BODY MASS INDEX: 21.68 KG/M2 | SYSTOLIC BLOOD PRESSURE: 155 MMHG | HEIGHT: 72 IN | DIASTOLIC BLOOD PRESSURE: 72 MMHG | WEIGHT: 160.05 LBS | HEART RATE: 106 BPM

## 2021-05-19 DIAGNOSIS — M25.561 CHRONIC PAIN OF RIGHT KNEE: Primary | ICD-10-CM

## 2021-05-19 DIAGNOSIS — G89.29 CHRONIC PAIN OF RIGHT KNEE: Primary | ICD-10-CM

## 2021-05-19 PROCEDURE — 99213 OFFICE O/P EST LOW 20 MIN: CPT | Performed by: ORTHOPAEDIC SURGERY

## 2021-05-19 NOTE — PROGRESS NOTES
[facility] [place] Orthopa[facility] Surgery Clinic Note    Subjective     CC: Pain of the Right Knee      HPI  Sonny Goel is a 30 y.o. male who presents with new problem of: right knee pain.  Onset: atraumatic and gradual in nature. The issue has been ongoing for 1 day(s). Pain is a 7/10 on the pain scale. Pain is described as burning and throbbing. Associated symptoms include pain, swelling and stiffness. The pain is worse with walking, standing, working and leisure; ice and pain medication and/or NSAID improve the pain. Previous treatments have included: bracing and NSAIDS.    I have reviewed the following portions of the patient's history:History of Present Illness and review of systems.      He has worsening right knee pain.  Started 3 weeks ago.  He went to ER yesterday because his back locked up.  His back hurt because he has been walking weird on his right knee.  He has had months of physical therapy and a home exercise program.  He tried a knee brace and anti-inflammatories.        Review of Systems   Constitutional: Negative.  Negative for chills, fatigue and fever.   HENT: Negative.  Negative for congestion and dental problem.    Eyes: Negative.  Negative for blurred vision.   Respiratory: Negative.  Negative for shortness of breath.    Cardiovascular: Negative.  Negative for leg swelling.   Gastrointestinal: Negative.  Negative for abdominal pain.   Endocrine: Negative.  Negative for polyuria.   Genitourinary: Negative.  Negative for difficulty urinating.   Musculoskeletal: Positive for arthralgias.   Skin: Negative.    Allergic/Immunologic: Negative.    Neurological: Negative.    Hematological: Negative.  Negative for adenopathy.   Psychiatric/Behavioral: Negative.  Negative for behavioral problems.       ROS:    Constiutional:Pt denies fever, chills, nausea, or vomiting.  MSK:as above      Objective      Past Medical History  Past Medical History:   Diagnosis Date   • Anemia     low iron          Physical  "Exam  /72   Pulse 106   Ht 182.9 cm (72.01\")   Wt 72.6 kg (160 lb 0.9 oz)   BMI 21.70 kg/m²     Body mass index is 21.7 kg/m².    Patient is well nourished and well developed.        Ortho Exam  Right knee is tender medial joint line.  Tender hamstrings.  Pain with Deanna.  Trace effusion.  Firm endpoint on Lachman.    Imaging/Labs/EMG Reviewed:  Imaging Results (Last 24 Hours)     ** No results found for the last 24 hours. **      Reviewed his x-rays from yesterday which are negative    Assessment:  1. Chronic pain of right knee        Plan:  1. Recommend over the counter anti-inflammatories for pain and/or swelling  2. Have ordered an MRI of the knee because he has had almost a year of pain.  He failed physical therapy bracing and anti-inflammatories.  I am concerned he could have a meniscus tear.  He is young and healthy and should be able to run and play soccer.  He has been unable to do so.    Follow Up:   Return for After MRI.      Medical Decision Making  Management Options : Low - 1 of 2 Categories = Category 1 - Review of Tests and Documents Category 2 - Assessment requiring an independent interpretation of tests         Miguel Angel Cortez M.D., FAAOS  Orthopedic Surgeon  Fellowship Trained Sports Medicine  [facility]  Orthopedics and Sports Medicine  [address], [address]  [place] [address]  "

## 2021-06-02 ENCOUNTER — HOSPITAL ENCOUNTER (OUTPATIENT)
Dept: MRI IMAGING | Facility: HOSPITAL | Age: 31
Discharge: HOME | End: 2021-06-02
Admitting: ORTHOPAEDIC SURGERY

## 2021-06-02 DIAGNOSIS — M25.561 CHRONIC PAIN OF RIGHT KNEE: ICD-10-CM

## 2021-06-02 DIAGNOSIS — G89.29 CHRONIC PAIN OF RIGHT KNEE: ICD-10-CM

## 2021-06-02 PROCEDURE — 73721 MRI JNT OF LWR EXTRE W/O DYE: CPT

## 2021-06-23 ENCOUNTER — OFFICE VISIT (OUTPATIENT)
Dept: ORTHOPEDIC SURGERY | Facility: CLINIC | Age: 31
End: 2021-06-23

## 2021-06-23 VITALS
DIASTOLIC BLOOD PRESSURE: 72 MMHG | SYSTOLIC BLOOD PRESSURE: 161 MMHG | BODY MASS INDEX: 21.86 KG/M2 | WEIGHT: 161.4 LBS | HEIGHT: 72 IN | HEART RATE: 89 BPM

## 2021-06-23 DIAGNOSIS — G89.29 CHRONIC PAIN OF RIGHT KNEE: Primary | ICD-10-CM

## 2021-06-23 DIAGNOSIS — M25.561 CHRONIC PAIN OF RIGHT KNEE: Primary | ICD-10-CM

## 2021-06-23 DIAGNOSIS — S83.411D SPRAIN OF MEDIAL COLLATERAL LIGAMENT OF RIGHT KNEE, SUBSEQUENT ENCOUNTER: ICD-10-CM

## 2021-06-23 DIAGNOSIS — T14.8XXA BONE BRUISE: ICD-10-CM

## 2021-06-23 PROCEDURE — 99213 OFFICE O/P EST LOW 20 MIN: CPT | Performed by: ORTHOPAEDIC SURGERY

## 2021-06-23 NOTE — PROGRESS NOTES
"      [facility] [place] Orthopaedic Surgery Clinic Note    Subjective     CC: Follow-up (Right knee MRI follow up. MRI done on 6-2-21)      AALIYAH Goel is a 30 y.o. male.  He is doing little bit better.  But now his left knee is hurting some.  He thinks he injured it about 8 weeks ago    Review of Systems   Constitutional: Negative.  Negative for chills, fatigue and fever.   HENT: Negative.  Negative for congestion and dental problem.    Eyes: Negative.  Negative for blurred vision.   Respiratory: Negative.  Negative for shortness of breath.    Cardiovascular: Negative.  Negative for leg swelling.   Gastrointestinal: Negative.  Negative for abdominal pain.   Endocrine: Negative.  Negative for polyuria.   Genitourinary: Negative.  Negative for difficulty urinating.   Musculoskeletal: Positive for arthralgias.   Skin: Negative.    Allergic/Immunologic: Negative.    Neurological: Negative.    Hematological: Negative.  Negative for adenopathy.   Psychiatric/Behavioral: Negative.  Negative for behavioral problems.       ROS:    Constiutional:Pt denies fever, chills, nausea, or vomiting.  MSK:as above      Objective      Past Medical History  Past Medical History:   Diagnosis Date   • Anemia     low iron          Physical Exam  /72   Pulse 89   Ht 182.9 cm (72.01\")   Wt 73.2 kg (161 lb 6.4 oz)   BMI 21.88 kg/m²     Body mass index is 21.88 kg/m².    Patient is well nourished and well developed.        Ortho Exam  No swelling no effusion.  Tender medial joint line    Imaging/Labs/EMG Reviewed:  Imaging Results (Last 24 Hours)     ** No results found for the last 24 hours. **      I viewed his MRI from June 2 which shows a medial femoral condyle bone bruise and MCL sprain  Assessment:  1. Chronic pain of right knee    2. Sprain of medial collateral ligament of right knee, subsequent encounter    3. Bone bruise        Plan:  1. Recommend over the counter anti-inflammatories for pain and/or swelling  2. I have " ordered physical therapy.  He would like to go to [facility] with history center.  Follow-up in 1 month    Follow Up:   Return in about 1 month (around 7/23/2021).      Medical Decision Making  Management Options : Low - OT or PT Therapy  and 1 of 2 Categories = Category 1 - Review of Tests and Documents Category 2 - Assessment requiring an independent interpretation of tests         Miguel Angel Cortez M.D., [facility]  Orthopedic Surgeon  Fellowship Trained Sports Medicine  [facility]  Orthopedics and Sports Medicine  [address] [place] [address], [address]  [place] [address]

## 2021-07-21 ENCOUNTER — OFFICE VISIT (OUTPATIENT)
Dept: ORTHOPEDIC SURGERY | Facility: CLINIC | Age: 31
End: 2021-07-21

## 2021-07-21 VITALS
HEART RATE: 90 BPM | DIASTOLIC BLOOD PRESSURE: 78 MMHG | HEIGHT: 72 IN | WEIGHT: 161.38 LBS | SYSTOLIC BLOOD PRESSURE: 146 MMHG | BODY MASS INDEX: 21.86 KG/M2

## 2021-07-21 DIAGNOSIS — T14.8XXA BONE BRUISE: ICD-10-CM

## 2021-07-21 DIAGNOSIS — S83.411D SPRAIN OF MEDIAL COLLATERAL LIGAMENT OF RIGHT KNEE, SUBSEQUENT ENCOUNTER: Primary | ICD-10-CM

## 2021-07-21 PROCEDURE — 99213 OFFICE O/P EST LOW 20 MIN: CPT | Performed by: ORTHOPAEDIC SURGERY

## 2021-07-21 NOTE — PROGRESS NOTES
"      [facility] [place] [facility] [place]    Subjective     CC: Follow-up (1 month follow up; Chronic pain of right knee )      AALIYAH Goel is a 30 y.o. male.  He is doing better.  His only complaint now is left foot plantar fasciitis.  His knee is well.  He would like to continue physical therapy    Review of Systems   Constitutional: Negative.    HENT: Negative.    Eyes: Negative.    Respiratory: Negative.    Cardiovascular: Negative.    Gastrointestinal: Negative.    Endocrine: Negative.    Genitourinary: Negative.    Musculoskeletal: Positive for arthralgias.   Skin: Negative.    Allergic/Immunologic: Negative.    Neurological: Negative.    Hematological: Negative.    Psychiatric/Behavioral: Negative.        ROS:    Constiutional:Pt denies fever, chills, nausea, or vomiting.  MSK:as above      Objective      Past Medical History  Past Medical History:   Diagnosis Date   • Anemia     low iron          Physical Exam  /78   Pulse 90   Ht 182.9 cm (72.01\")   Wt 73.2 kg (161 lb 6 oz)   BMI 21.88 kg/m²     Body mass index is 21.88 kg/m².    Patient is well nourished and well developed.        Ortho Exam  Right knee has no swelling and no effusion.  No tenderness.  Full motion.    Imaging/Labs/EMG Reviewed:  Imaging Results (Last 24 Hours)     ** No results found for the last 24 hours. **          Assessment:  1. Sprain of medial collateral ligament of right knee, subsequent encounter    2. Bone bruise        Plan:  1. Recommend over the counter anti-inflammatories for pain and/or swelling  2. He will continue physical therapy and follow-up as needed    Follow Up:   Return if symptoms worsen or fail to improve.      Medical Decision Making  Management Options : Low - OTC Drugs and OT or PT Therapy         Miguel Angel Cortez M.D., FAAOS  Orthopedic Surgeon  Fellowship Trained Sports Medicine  [facility]  Orthopedics and Sports Medicine  [address], [address]  [place] [address]    EMR Dragon/Transcription disclaimer:  Much of this encounter note is an electronic transcription of spoken language to printed text. Electronic transcription of spoken language may permit erroneous, or at times, nonsensical words or phrases to be inadvertently transcribed. Although I have reviewed the note for such errors, some may still exist.

## 2022-02-21 ENCOUNTER — OFFICE VISIT (OUTPATIENT)
Dept: FAMILY MEDICINE CLINIC | Facility: CLINIC | Age: 32
End: 2022-02-21

## 2022-02-21 ENCOUNTER — LAB (OUTPATIENT)
Dept: LAB | Facility: HOSPITAL | Age: 32
End: 2022-02-21

## 2022-02-21 VITALS
HEART RATE: 74 BPM | BODY MASS INDEX: 22.75 KG/M2 | HEIGHT: 72 IN | WEIGHT: 168 LBS | DIASTOLIC BLOOD PRESSURE: 78 MMHG | SYSTOLIC BLOOD PRESSURE: 120 MMHG | OXYGEN SATURATION: 98 %

## 2022-02-21 DIAGNOSIS — N50.812 PAIN IN LEFT TESTICLE: ICD-10-CM

## 2022-02-21 DIAGNOSIS — R35.0 URINARY FREQUENCY: ICD-10-CM

## 2022-02-21 DIAGNOSIS — Z00.00 PREVENTATIVE HEALTH CARE: ICD-10-CM

## 2022-02-21 DIAGNOSIS — Z00.00 PREVENTATIVE HEALTH CARE: Primary | ICD-10-CM

## 2022-02-21 LAB
25(OH)D3 SERPL-MCNC: 24.2 NG/ML (ref 30–100)
ALBUMIN SERPL-MCNC: 5.5 G/DL (ref 3.5–5.2)
ALBUMIN/GLOB SERPL: 2.6 G/DL
ALP SERPL-CCNC: 100 U/L (ref 39–117)
ALT SERPL W P-5'-P-CCNC: 14 U/L (ref 1–41)
ANION GAP SERPL CALCULATED.3IONS-SCNC: 10.7 MMOL/L (ref 5–15)
AST SERPL-CCNC: 22 U/L (ref 1–40)
BASOPHILS # BLD AUTO: 0.03 10*3/MM3 (ref 0–0.2)
BASOPHILS NFR BLD AUTO: 0.5 % (ref 0–1.5)
BILIRUB BLD-MCNC: NEGATIVE MG/DL
BILIRUB SERPL-MCNC: 0.5 MG/DL (ref 0–1.2)
BUN SERPL-MCNC: 16 MG/DL (ref 6–20)
BUN/CREAT SERPL: 15.8 (ref 7–25)
CALCIUM SPEC-SCNC: 10.1 MG/DL (ref 8.6–10.5)
CHLORIDE SERPL-SCNC: 101 MMOL/L (ref 98–107)
CHOLEST SERPL-MCNC: 206 MG/DL (ref 0–200)
CLARITY, POC: CLEAR
CO2 SERPL-SCNC: 27.3 MMOL/L (ref 22–29)
COLOR UR: YELLOW
CREAT SERPL-MCNC: 1.01 MG/DL (ref 0.76–1.27)
DEPRECATED RDW RBC AUTO: 42 FL (ref 37–54)
EOSINOPHIL # BLD AUTO: 0.08 10*3/MM3 (ref 0–0.4)
EOSINOPHIL NFR BLD AUTO: 1.4 % (ref 0.3–6.2)
ERYTHROCYTE [DISTWIDTH] IN BLOOD BY AUTOMATED COUNT: 14 % (ref 12.3–15.4)
EXPIRATION DATE: ABNORMAL
GFR SERPL CREATININE-BSD FRML MDRD: 106 ML/MIN/1.73
GLOBULIN UR ELPH-MCNC: 2.1 GM/DL
GLUCOSE SERPL-MCNC: 91 MG/DL (ref 65–99)
GLUCOSE UR STRIP-MCNC: NEGATIVE MG/DL
HBA1C MFR BLD: 5.6 % (ref 4.8–5.6)
HCT VFR BLD AUTO: 46.6 % (ref 37.5–51)
HDLC SERPL-MCNC: 44 MG/DL (ref 40–60)
HGB BLD-MCNC: 15.7 G/DL (ref 13–17.7)
IMM GRANULOCYTES # BLD AUTO: 0.02 10*3/MM3 (ref 0–0.05)
IMM GRANULOCYTES NFR BLD AUTO: 0.3 % (ref 0–0.5)
KETONES UR QL: NEGATIVE
LDLC SERPL CALC-MCNC: 151 MG/DL (ref 0–100)
LDLC/HDLC SERPL: 3.4 {RATIO}
LEUKOCYTE EST, POC: NEGATIVE
LYMPHOCYTES # BLD AUTO: 1.41 10*3/MM3 (ref 0.7–3.1)
LYMPHOCYTES NFR BLD AUTO: 24 % (ref 19.6–45.3)
Lab: ABNORMAL
MCH RBC QN AUTO: 28.2 PG (ref 26.6–33)
MCHC RBC AUTO-ENTMCNC: 33.7 G/DL (ref 31.5–35.7)
MCV RBC AUTO: 83.7 FL (ref 79–97)
MONOCYTES # BLD AUTO: 0.42 10*3/MM3 (ref 0.1–0.9)
MONOCYTES NFR BLD AUTO: 7.2 % (ref 5–12)
NEUTROPHILS NFR BLD AUTO: 3.91 10*3/MM3 (ref 1.7–7)
NEUTROPHILS NFR BLD AUTO: 66.6 % (ref 42.7–76)
NITRITE UR-MCNC: NEGATIVE MG/ML
NRBC BLD AUTO-RTO: 0 /100 WBC (ref 0–0.2)
PH UR: 6 [PH] (ref 5–8)
PLATELET # BLD AUTO: 212 10*3/MM3 (ref 140–450)
PMV BLD AUTO: 10.5 FL (ref 6–12)
POTASSIUM SERPL-SCNC: 4.8 MMOL/L (ref 3.5–5.2)
PROT SERPL-MCNC: 7.6 G/DL (ref 6–8.5)
PROT UR STRIP-MCNC: ABNORMAL MG/DL
RBC # BLD AUTO: 5.57 10*6/MM3 (ref 4.14–5.8)
RBC # UR STRIP: NEGATIVE /UL
SODIUM SERPL-SCNC: 139 MMOL/L (ref 136–145)
SP GR UR: 1.02 (ref 1–1.03)
T4 FREE SERPL-MCNC: 1.21 NG/DL (ref 0.93–1.7)
TRIGL SERPL-MCNC: 61 MG/DL (ref 0–150)
TSH SERPL DL<=0.05 MIU/L-ACNC: 0.66 UIU/ML (ref 0.27–4.2)
UROBILINOGEN UR QL: NORMAL
VLDLC SERPL-MCNC: 11 MG/DL (ref 5–40)
WBC NRBC COR # BLD: 5.87 10*3/MM3 (ref 3.4–10.8)

## 2022-02-21 PROCEDURE — 87086 URINE CULTURE/COLONY COUNT: CPT

## 2022-02-21 PROCEDURE — 99395 PREV VISIT EST AGE 18-39: CPT | Performed by: INTERNAL MEDICINE

## 2022-02-21 PROCEDURE — 83036 HEMOGLOBIN GLYCOSYLATED A1C: CPT

## 2022-02-21 PROCEDURE — 82306 VITAMIN D 25 HYDROXY: CPT

## 2022-02-21 PROCEDURE — 80061 LIPID PANEL: CPT

## 2022-02-21 PROCEDURE — 81003 URINALYSIS AUTO W/O SCOPE: CPT | Performed by: INTERNAL MEDICINE

## 2022-02-21 PROCEDURE — 80050 GENERAL HEALTH PANEL: CPT

## 2022-02-21 PROCEDURE — 84439 ASSAY OF FREE THYROXINE: CPT

## 2022-02-21 NOTE — PROGRESS NOTES
Chief Complaint   Patient presents with   • Annual Exam   • Urinary Frequency   • Difficulty Urinating     pain on the left side        HPI:  Sonny Goel is a 31 y.o. male who presents today for annual physical.  He reports increased urinary frequency worsening over the past 2 to 3 months.  He woke up 4-5 times last night to go to the bathroom.  Denies any dysuria or pelvic pain.  He does report left-sided testicle pain.    ROS:  Constitutional: no fevers, night sweats or unexplained weight loss  Eyes: no vision changes  ENT: no runny nose, ear pain, sore throat  Cardio: no chest pain, palpitations  Pulm: no shortness of breath, wheezing, or cough  GI: no abdominal pain or changes in bowel movements  : no difficulty urinating  MSK: no difficulty ambulating, no joint pain  Neuro: no weakness, dizziness or headache  Psych: no trouble sleeping  Endo: no change in appetite      Past Medical History:   Diagnosis Date   • Anemia     low iron       Family History   Problem Relation Age of Onset   • Breast cancer Neg Hx    • Diabetes Neg Hx    • Heart attack Neg Hx    • Hypertension Neg Hx    • Migraines Neg Hx    • Stroke Neg Hx       Social History     Socioeconomic History   • Marital status: Single   Tobacco Use   • Smoking status: Never Smoker   • Smokeless tobacco: Never Used   Substance and Sexual Activity   • Alcohol use: Yes     Comment: socially   • Drug use: No   • Sexual activity: Yes     Partners: Female     Birth control/protection: Condom      No Known Allergies   Immunization History   Administered Date(s) Administered   • COVID-19 (PFIZER) PURPLE CAP 12/05/2021, 12/26/2021   • Hep A, 2 Dose 01/29/2009, 04/07/2010   • Varicella 01/29/2009        PE:  Vitals:    02/21/22 1016   BP: 120/78   Pulse: 74   SpO2: 98%      Body mass index is 22.78 kg/m².    Gen Appearance: NAD  HEENT: Normocephalic, PERRLA, no thyromegaly, trache midline  Heart: RRR, normal S1 and S2, no murmur  Lungs: CTA b/l, no  wheezing, no crackles  Abdomen: Soft, non-tender, non-distended, no guarding and BSx4  MSK: Moves all extremities well, normal gait, no peripheral edema  Pulses: Palpable and equal b/l  Lymph nodes: No palpable lymphadenopathy   Neuro: No focal deficits  Genital: Left-sided testicular pain, no masses on exam    No current outpatient medications on file.     No current facility-administered medications for this visit.        Diagnoses and all orders for this visit:    1. Preventative health care (Primary)  -     CBC & Differential; Future  -     Comprehensive Metabolic Panel; Future  -     Hemoglobin A1c; Future  -     Lipid Panel; Future  -     TSH+Free T4; Future  -     Vitamin D 25 Hydroxy; Future  -     POC Urinalysis Dipstick, Automated  Counseled on healthy weight, nutrition, physical activity, cancer screening, and immunizations.    2. Urinary frequency  -     Ambulatory Referral to Urology    3. Pain in left testicle  -     POC Urinalysis Dipstick, Automated  -     Ambulatory Referral to Urology  On exam patient has pain to palpation but no obvious masses.  Recommend establishing care with urology due to increased urinary frequency and duration of symptoms.  Will likely need ultrasound evaluation initially.  No signs of infection on urinalysis.       Return in about 1 year (around 2/21/2023) for Annual.     Dictated Utilizing Dragon Dictation    Please note that portions of this note were completed with a voice recognition program.    Part of this note may be an electronic transcription/translation of spoken language to printed text using the Dragon Dictation System.

## 2022-02-22 LAB — BACTERIA SPEC AEROBE CULT: NO GROWTH

## 2022-04-01 ENCOUNTER — OFFICE VISIT (OUTPATIENT)
Dept: UROLOGY | Facility: CLINIC | Age: 32
End: 2022-04-01

## 2022-04-01 ENCOUNTER — LAB (OUTPATIENT)
Dept: LAB | Facility: HOSPITAL | Age: 32
End: 2022-04-01

## 2022-04-01 VITALS
HEART RATE: 79 BPM | HEIGHT: 72 IN | WEIGHT: 171.8 LBS | SYSTOLIC BLOOD PRESSURE: 124 MMHG | DIASTOLIC BLOOD PRESSURE: 78 MMHG | OXYGEN SATURATION: 98 % | BODY MASS INDEX: 23.27 KG/M2

## 2022-04-01 DIAGNOSIS — R35.1 NOCTURIA: ICD-10-CM

## 2022-04-01 DIAGNOSIS — R35.0 URINARY FREQUENCY: ICD-10-CM

## 2022-04-01 DIAGNOSIS — N50.812 LEFT TESTICULAR PAIN: ICD-10-CM

## 2022-04-01 DIAGNOSIS — R35.0 URINARY FREQUENCY: Primary | ICD-10-CM

## 2022-04-01 LAB
BILIRUB BLD-MCNC: NEGATIVE MG/DL
CLARITY, POC: CLEAR
COLOR UR: YELLOW
EXPIRATION DATE: NORMAL
GLUCOSE UR STRIP-MCNC: NEGATIVE MG/DL
KETONES UR QL: NEGATIVE
LEUKOCYTE EST, POC: NEGATIVE
Lab: NORMAL
NITRITE UR-MCNC: NEGATIVE MG/ML
PH UR: 6 [PH] (ref 5–8)
PROT UR STRIP-MCNC: NEGATIVE MG/DL
RBC # UR STRIP: NEGATIVE /UL
SP GR UR: 1.02 (ref 1–1.03)
UROBILINOGEN UR QL: NORMAL

## 2022-04-01 PROCEDURE — 81003 URINALYSIS AUTO W/O SCOPE: CPT | Performed by: STUDENT IN AN ORGANIZED HEALTH CARE EDUCATION/TRAINING PROGRAM

## 2022-04-01 PROCEDURE — 87109 MYCOPLASMA: CPT

## 2022-04-01 PROCEDURE — 51798 US URINE CAPACITY MEASURE: CPT | Performed by: STUDENT IN AN ORGANIZED HEALTH CARE EDUCATION/TRAINING PROGRAM

## 2022-04-01 PROCEDURE — 87086 URINE CULTURE/COLONY COUNT: CPT

## 2022-04-01 PROCEDURE — 99204 OFFICE O/P NEW MOD 45 MIN: CPT | Performed by: STUDENT IN AN ORGANIZED HEALTH CARE EDUCATION/TRAINING PROGRAM

## 2022-04-01 RX ORDER — TOLTERODINE 4 MG/1
4 CAPSULE, EXTENDED RELEASE ORAL DAILY
Qty: 30 CAPSULE | Refills: 0 | Status: SHIPPED | OUTPATIENT
Start: 2022-04-01 | End: 2022-05-01

## 2022-04-01 RX ORDER — CIPROFLOXACIN 500 MG/1
500 TABLET, FILM COATED ORAL 2 TIMES DAILY
Qty: 20 TABLET | Refills: 0 | OUTPATIENT
Start: 2022-04-01 | End: 2023-02-25

## 2022-04-01 NOTE — PROGRESS NOTES
"     LUT[facility] Male Office Visit      Patient Name: Sonny Goel  : 1990   MRN: [id number]     Chief Complaint:  Lower Urinary Tract Symptoms.   Chief Complaint   Patient presents with   • New Patient   • Urinary Frequency   • Testicle Pain     On Left side        Referring Provider: Mateo Barrera, *    History of Present Illness: Mr. Goel is a 31 y.o. male with history of lower urinary tract symptoms and testicular pain presents to [facility].  He has no significant past medical history.    The patient reports primarily his urinary symptoms began 6-7 months ago. Started developing frequent urination, despite fluid restriction still struggled with urinary urgency and frequency. Still waking up to urinate 3-4x nightly.  At one point was waking up 5 or more times a night to urinate.  He states his urine volumes are somewhat low but still had persistent feeling of needing to urinate.  Patient states that the strength of his stream is preserved.  He denies hesitancy or intermittency.    Patient is sexually active, he is monogamous. He denies dysuria. He denies prior STDs.  He has girlfriend use IUD for contraception.    Patient reports over the last 6 to 7 months he has been trying to gain weight for personal fitness reasons, states his diet has been somewhat poor in the last 6 months for  \"bulking\" reasons.  Sometimes the patient was drinking Mountain Dew and other sodas but he has cut back, he does not have a significant caffeine intake.    His left sided testicular pain has been present for the last few months, however he is not completely sure.  He states that is a intermittent, nagging, aching pain.  Denies fevers or chills.  Denies scrotal swelling or testicular swelling.    Postvoid residual bladder scan 0 mL.      Subjective      Review of System: Review of Systems   Constitutional: Negative for chills, fatigue, fever and unexpected weight change.   HENT: Negative for sore throat.  "   Eyes: Negative for visual disturbance.   Respiratory: Negative for cough, chest tightness and shortness of breath.    Cardiovascular: Negative for chest pain and leg swelling.   Gastrointestinal: Negative for blood in stool, constipation, diarrhea, nausea, rectal pain and vomiting.   Genitourinary: Positive for frequency and testicular pain. Negative for decreased urine volume, difficulty urinating, dysuria, enuresis, flank pain, genital sores, hematuria and urgency.   Musculoskeletal: Negative for back pain and joint swelling.   Skin: Negative for rash and wound.   Neurological: Negative for seizures, speech difficulty, weakness and headaches.   Psychiatric/Behavioral: Negative for confusion, sleep disturbance and suicidal ideas. The patient is not nervous/anxious.       I have reviewed the ROS documented by my clinical staff, I have updated appropriately and I agree. Luis Charles MD    Past Medical History:  Past Medical History:   Diagnosis Date   • Anemia     low iron        Past Surgical History:  History reviewed. No pertinent surgical history.    Medications:    Current Outpatient Medications:   •  ciprofloxacin (Cipro) 500 MG tablet, Take 1 tablet by mouth 2 (Two) Times a Day., Disp: 20 tablet, Rfl: 0  •  tolterodine LA (Detrol LA) 4 MG 24 hr capsule, Take 1 capsule by mouth Daily for 30 days., Disp: 30 capsule, Rfl: 0    Allergies:  No Known Allergies    Social History:  Social History     Socioeconomic History   • Marital status: Single   Tobacco Use   • Smoking status: Never Smoker   • Smokeless tobacco: Never Used   Vaping Use   • Vaping Use: Never used   Substance and Sexual Activity   • Alcohol use: Yes     Comment: socially   • Drug use: No   • Sexual activity: Yes     Partners: Female     Birth control/protection: Condom       Family History:  Family History   Problem Relation Age of Onset   • Breast cancer Neg Hx    • Diabetes Neg Hx    • Heart attack Neg Hx    • Hypertension Neg Hx    •  "Migraines Neg Hx    • Stroke Neg Hx           Post void residual bladder scan:   0 mL    Objective     Physical Exam:   Vital Signs:   Vitals:    04/01/22 0754   BP: 124/78   Pulse: 79   SpO2: 98%   Weight: 77.9 kg (171 lb 12.8 oz)   Height: 182.9 cm (72.01\")     Body mass index is 23.29 kg/m².     Physical Exam  Vitals and nursing note reviewed.   Constitutional:       Appearance: Normal appearance.   HENT:      Head: Normocephalic and atraumatic.      Mouth/Throat:      Mouth: Mucous membranes are moist.      Pharynx: Oropharynx is clear.   Eyes:      Extraocular Movements: Extraocular movements intact.      Conjunctiva/sclera: Conjunctivae normal.   Cardiovascular:      Rate and Rhythm: Normal rate and regular rhythm.   Pulmonary:      Effort: Pulmonary effort is normal. No respiratory distress.   Abdominal:      Palpations: Abdomen is soft.      Tenderness: There is no abdominal tenderness. There is no right CVA tenderness or left CVA tenderness.   Genitourinary:     Comments:  Circumcised phallus, orthotopic meatus, bilaterally descended testicles without masses, or lesions. Mild tenderness over left epididymal tail.      Musculoskeletal:         General: Normal range of motion.      Cervical back: Normal range of motion.   Skin:     General: Skin is warm and dry.   Neurological:      General: No focal deficit present.      Mental Status: He is alert and oriented to person, place, and time.   Psychiatric:         Mood and Affect: Mood normal.         Behavior: Behavior normal.         Labs:   No results found for: PSA    Brief Urine Lab Results  (Last result in the past 365 days)      Color   Clarity   Blood   Leuk Est   Nitrite   Protein   CREAT   Urine HCG        04/01/22 0808 Yellow   Clear   Negative   Negative   Negative   Negative                 Urine Culture    Urine Culture 2/21/22   Urine Culture No growth              Lab Results   Component Value Date    GLUCOSE 91 02/21/2022    CALCIUM 10.1 " 02/21/2022     02/21/2022    K 4.8 02/21/2022    CO2 27.3 02/21/2022     02/21/2022    BUN 16 02/21/2022    CREATININE 1.01 02/21/2022    EGFRIFAFRI 106 02/21/2022    BCR 15.8 02/21/2022    ANIONGAP 10.7 02/21/2022       Lab Results   Component Value Date    WBC 5.87 02/21/2022    HGB 15.7 02/21/2022    HCT 46.6 02/21/2022    MCV 83.7 02/21/2022     02/21/2022       Images:   No Images in the past 120 days found..    Measures:   Tobacco:   Sonny Goel  reports that he has never smoked. He has never used smokeless tobacco.       Assessment / Plan      Assessment:  Mr. Goel is a 31 y.o. male who presented today with lower urinary tract symptoms and left-sided testicular pain.  Patient's symptoms have been present for 6 to 7 months at least.  More bothersome is the patient's urinary frequency urgency and nocturia.  He has been on a weight gain journey recently for personal health and fitness reasons, he states changes in his diet may have triggered his urinary symptoms.  This is possibly reasonable.  Discussed avoiding caffeinated beverages.  Discussed symptoms could be explained by prostatitis however he does not currently meet the diagnostic criteria.  Smoldering epididymitis or prostatitis could cause his symptoms as well which we will treat empirically with ciprofloxacin.  We will send urine culture and mycoplasma/Ureaplasma culture as well.  We will initiate anticholinergic treatment for his bothersome nocturia which is preventing sleep.  We discussed side effects including dry, dry mouth, constipation.  We will see him in 6 weeks for reevaluation.    To get a baseline, we will order scrotal ultrasound to rule out testicular lesions or masses as well.  I will call him with results.    Diagnoses and all orders for this visit:    1. Urinary frequency (Primary)  -     POC Urinalysis Dipstick, Automated  -     tolterodine LA (Detrol LA) 4 MG 24 hr capsule; Take 1 capsule by mouth Daily  for 30 days.  Dispense: 30 capsule; Refill: 0  -     Mycoplasma / Ureaplasma Culture - Urine, Urine, Clean Catch; Future    2. Nocturia  -     tolterodine LA (Detrol LA) 4 MG 24 hr capsule; Take 1 capsule by mouth Daily for 30 days.  Dispense: 30 capsule; Refill: 0  -     Mycoplasma / Ureaplasma Culture - Urine, Urine, Clean Catch; Future    3. Left testicular pain  -     US Scrotum & Testicles; Future  -     ciprofloxacin (Cipro) 500 MG tablet; Take 1 tablet by mouth 2 (Two) Times a Day.  Dispense: 20 tablet; Refill: 0  -     Mycoplasma / Ureaplasma Culture - Urine, Urine, Clean Catch; Future              Follow Up:   Return in about 6 weeks (around 5/13/2022).    I spent approximately 45 minutes providing clinical care for this patient; including review of patient's chart and provider documentation, face to face time spent with patient in examination room (obtaining history, performing physical exam, discussing diagnosis and management options), placing orders, and completing patient documentation.     Luis Charles MD  [facility] [place]

## 2022-04-02 LAB — BACTERIA SPEC AEROBE CULT: NO GROWTH

## 2022-04-08 NOTE — PROGRESS NOTES
Called patient and left him a voicemail indicating that his Ureaplasma culture resulted positive.  He was prescribed ciprofloxacin.  Asked him to call me back to discuss, if he is not receiving symptom benefit from ciprofloxacin we may need to place him on doxycycline.  We know that Ureaplasma can sometimes be a normal colonizer of the urinary tract however with persistent urinary symptoms, this needs to be treated.

## 2022-04-10 LAB
M HOMINIS SPEC QL CULT: NEGATIVE
U UREALYTICUM SPEC QL CULT: POSITIVE

## 2022-04-26 ENCOUNTER — HOSPITAL ENCOUNTER (OUTPATIENT)
Dept: ULTRASOUND IMAGING | Facility: HOSPITAL | Age: 32
Discharge: HOME | End: 2022-04-26
Admitting: STUDENT IN AN ORGANIZED HEALTH CARE EDUCATION/TRAINING PROGRAM

## 2022-04-26 DIAGNOSIS — N50.812 LEFT TESTICULAR PAIN: ICD-10-CM

## 2022-04-26 PROCEDURE — 76870 US EXAM SCROTUM: CPT

## 2022-04-27 ENCOUNTER — TELEPHONE (OUTPATIENT)
Dept: UROLOGY | Facility: CLINIC | Age: 32
End: 2022-04-27

## 2022-04-27 NOTE — TELEPHONE ENCOUNTER
Called the patient with the results of the scrotal ultrasound which demonstrates normal findings.  His testicular examination was also benign.  He is still taking ciprofloxacin for possible epididymitis and should be covering Ureaplasma which was positive on his urine culture.  He sees me in May for follow-up, we will see how he was doing at that time.  He states he is doing fine other than some mild urinary urgency and frequency, he is taking tolterodine as prescribed.    Luis Charles MD

## 2022-05-16 ENCOUNTER — OFFICE VISIT (OUTPATIENT)
Dept: UROLOGY | Facility: CLINIC | Age: 32
End: 2022-05-16

## 2022-05-16 VITALS
SYSTOLIC BLOOD PRESSURE: 126 MMHG | HEIGHT: 60 IN | HEART RATE: 76 BPM | BODY MASS INDEX: 33.57 KG/M2 | WEIGHT: 171 LBS | OXYGEN SATURATION: 98 % | DIASTOLIC BLOOD PRESSURE: 78 MMHG

## 2022-05-16 DIAGNOSIS — N50.812 PAIN IN LEFT TESTICLE: Primary | ICD-10-CM

## 2022-05-16 DIAGNOSIS — R35.1 NOCTURIA: ICD-10-CM

## 2022-05-16 DIAGNOSIS — R35.0 URINARY FREQUENCY: ICD-10-CM

## 2022-05-16 LAB
BILIRUB BLD-MCNC: NEGATIVE MG/DL
CLARITY, POC: CLEAR
COLOR UR: YELLOW
EXPIRATION DATE: ABNORMAL
GLUCOSE UR STRIP-MCNC: NEGATIVE MG/DL
KETONES UR QL: NEGATIVE
LEUKOCYTE EST, POC: NEGATIVE
Lab: ABNORMAL
NITRITE UR-MCNC: NEGATIVE MG/ML
PH UR: 6 [PH] (ref 5–8)
PROT UR STRIP-MCNC: ABNORMAL MG/DL
RBC # UR STRIP: NEGATIVE /UL
SP GR UR: 1.03 (ref 1–1.03)
UROBILINOGEN UR QL: NORMAL

## 2022-05-16 PROCEDURE — 81003 URINALYSIS AUTO W/O SCOPE: CPT | Performed by: STUDENT IN AN ORGANIZED HEALTH CARE EDUCATION/TRAINING PROGRAM

## 2022-05-16 PROCEDURE — 51798 US URINE CAPACITY MEASURE: CPT | Performed by: STUDENT IN AN ORGANIZED HEALTH CARE EDUCATION/TRAINING PROGRAM

## 2022-05-16 PROCEDURE — 99213 OFFICE O/P EST LOW 20 MIN: CPT | Performed by: STUDENT IN AN ORGANIZED HEALTH CARE EDUCATION/TRAINING PROGRAM

## 2022-05-16 NOTE — PROGRESS NOTES
Follow Up Office Visit      Patient Name: Sonny Goel  : 1990   MRN: [id number]     Chief Complaint:    Chief Complaint   Patient presents with   • Urinary Frequency   • Nocturia   • Left Testicular Pain       Referring Provider: No ref. provider found    History of Present Illness: Sonny Goel is a 31 y.o. male who presents today for follow up of lower urinary tract symptoms.    Patient was originally seen in my clinic on 2022, at that time the patient had significant urinary symptoms including frequent urination, urgency and frequency, nocturia 3-4 times a night.  Patient was also complaining of some left-sided testicular pain which was evaluated with a scrotal ultrasound was normal.    Patient was drinking significant amount of caffeinated beverage around that time as well.    Urine was checked from clinic and was positive for Ureaplasma.  He was treated with ciprofloxacin for this.  He was also started on tolterodine daily for his urgency complaints.  Patient completed both of these medications and reports his urinary symptoms have largely resolved.  His primary issue sounds like he is having a tough time getting off of the floor at his job to urinate on a frequent basis.  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  He is trying to be better about consistently voiding.  His urgency and frequency have reduced and he denies any dysuria.  The patient's testicular pain has resolved.    Doing well, PVR 12 mL.  Trace protein in urine otherwise negative.     Scrotal US 22   FINDINGS:  The right testicle measures 3.7 x 1.5 x 2.9 cm. Normal color Doppler  flow. Unremarkable 9 mm epididymis.     Left testicle measures 4.2 x 1.7 x 3.4 cm. Small hydrocele noted. Normal  color Doppler flow. Unremarkable 11 mm epididymis.      IMPRESSION:  Small left-sided hydrocele. Otherwise normal sonographic appearance of  the scrotum and bilateral testes.    "     Subjective      Review of System: Review of Systems   Constitutional: Negative for chills, fatigue, fever and unexpected weight change.   HENT: Negative for sore throat.    Eyes: Negative for visual disturbance.   Respiratory: Negative for cough, chest tightness and shortness of breath.    Cardiovascular: Negative for chest pain and leg swelling.   Gastrointestinal: Negative for blood in stool, constipation, diarrhea, nausea, rectal pain and vomiting.   Genitourinary: Positive for frequency. Negative for decreased urine volume, difficulty urinating, dysuria, enuresis, flank pain, genital sores, hematuria and urgency.   Musculoskeletal: Negative for back pain and joint swelling.   Skin: Negative for rash and wound.   Neurological: Negative for seizures, speech difficulty, weakness and headaches.   Psychiatric/Behavioral: Negative for confusion, sleep disturbance and suicidal ideas. The patient is not nervous/anxious.       I have reviewed the ROS documented by my clinical staff, I have updated appropriately and I agree. Luis Charles MD    I have reviewed and the following portions of the patient's history were updated as appropriate: past family history, past medical history, past social history, past surgical history and problem list.    Medications:     Current Outpatient Medications:   •  ciprofloxacin (Cipro) 500 MG tablet, Take 1 tablet by mouth 2 (Two) Times a Day., Disp: 20 tablet, Rfl: 0    Allergies:   No Known Allergies       Post void residual bladder scan:   12 mL    Objective     Physical Exam:   Vital Signs:   Vitals:    05/16/22 0921   BP: 126/78   Pulse: 76   SpO2: 98%   Weight: 77.6 kg (171 lb)   Height: 72 cm (28.35\")     Body mass index is 149.59 kg/m².     Physical Exam  Vitals and nursing note reviewed.   Constitutional:       Appearance: Normal appearance.   HENT:      Head: Normocephalic and atraumatic.      Mouth/Throat:      Mouth: Mucous membranes are moist.      Pharynx: " Oropharynx is clear.   Eyes:      Extraocular Movements: Extraocular movements intact.      Conjunctiva/sclera: Conjunctivae normal.   Cardiovascular:      Rate and Rhythm: Normal rate and regular rhythm.   Pulmonary:      Effort: Pulmonary effort is normal. No respiratory distress.   Abdominal:      Palpations: Abdomen is soft.      Tenderness: There is no abdominal tenderness. There is no right CVA tenderness or left CVA tenderness.   Genitourinary:     Comments: Deferred  Musculoskeletal:         General: Normal range of motion.      Cervical back: Normal range of motion.   Skin:     General: Skin is warm and dry.   Neurological:      General: No focal deficit present.      Mental Status: He is alert and oriented to person, place, and time.   Psychiatric:         Mood and Affect: Mood normal.         Behavior: Behavior normal.         Labs:   Brief Urine Lab Results  (Last result in the past 365 days)      Color   Clarity   Blood   Leuk Est   Nitrite   Protein   CREAT   Urine HCG        05/16/22 0939 Yellow   Clear   Negative   Negative   Negative   Trace                 Urine Culture    Urine Culture 2/21/22 4/1/22   Urine Culture No growth No growth              Lab Results   Component Value Date    GLUCOSE 91 02/21/2022    CALCIUM 10.1 02/21/2022     02/21/2022    K 4.8 02/21/2022    CO2 27.3 02/21/2022     02/21/2022    BUN 16 02/21/2022    CREATININE 1.01 02/21/2022    EGFRIFAFRI 106 02/21/2022    BCR 15.8 02/21/2022    ANIONGAP 10.7 02/21/2022       Lab Results   Component Value Date    WBC 5.87 02/21/2022    HGB 15.7 02/21/2022    HCT 46.6 02/21/2022    MCV 83.7 02/21/2022     02/21/2022       Images:   US Scrotum & Testicles    Result Date: 4/26/2022  Small left-sided hydrocele. Otherwise normal sonographic appearance of the scrotum and bilateral testes.  This report was finalized on 4/26/2022 10:21 PM by Shaun Meyer.        Measures:   Tobacco:   Sonny Goel  reports that  he has never smoked. He has never used smokeless tobacco.              Assessment / Plan      Assessment/Plan:   31 y.o. male who presented today for follow up of lower urinary tract symptoms.  Patient was found to have positive Ureaplasma infection at last clinic visit.  He was treated with ciprofloxacin and tolterodine.  Since treatment his symptoms have largely resolved.  We discussed the importance of timed and double voiding especially during working hours given stress and holding the bladder for long periods of time can exacerbate his symptoms.  He will do better about consistent timed voiding every few hours.  The patient can follow back up on an as-needed basis.  We had a long discussion about Ureaplasma infection, how this is typically a colonizing bacteria and is not representative of a true sexually transmitted infection, however this can be transmitted via intercourse.  He states he thinks his partner has had this type of bacterial infection in the past.  She has also been treated for this remotely, believes.    Patient was given my card, he can follow back up on an as-needed basis, I do not like long-term anticholinergic treatment on young man given the risk of side effects.  Patient reports understanding.    Diagnoses and all orders for this visit:    1. Nocturia  2. Urinary frequency    -Possibly as a result of Ureaplasma infection, treated with ciprofloxacin, symptoms largely resolved  -Stop Detrol  -See me back on a as needed basis    -     POC Urinalysis Dipstick, Automated    3. Left Testicular pain  -Resolved, no acute findings on scrotal US        Follow Up:   Return if symptoms worsen or fail to improve.    I spent approximately 20 minutes providing clinical care for this patient; including review of patient's chart and provider documentation, face to face time spent with patient in examination room (obtaining history, performing physical exam, discussing diagnosis and management options),  placing orders, and completing patient documentation.     Luis Charles MD  [facility] [place] [facility] [place]

## 2023-02-25 ENCOUNTER — APPOINTMENT (OUTPATIENT)
Dept: GENERAL RADIOLOGY | Facility: HOSPITAL | Age: 33
End: 2023-02-25
Payer: COMMERCIAL

## 2023-02-25 ENCOUNTER — HOSPITAL ENCOUNTER (EMERGENCY)
Facility: HOSPITAL | Age: 33
Discharge: HOME | End: 2023-02-25
Attending: EMERGENCY MEDICINE | Admitting: EMERGENCY MEDICINE
Payer: COMMERCIAL

## 2023-02-25 VITALS
WEIGHT: 174 LBS | RESPIRATION RATE: 14 BRPM | BODY MASS INDEX: 24.36 KG/M2 | OXYGEN SATURATION: 99 % | HEIGHT: 71 IN | DIASTOLIC BLOOD PRESSURE: 83 MMHG | HEART RATE: 87 BPM | SYSTOLIC BLOOD PRESSURE: 132 MMHG

## 2023-02-25 DIAGNOSIS — Z04.1 ENCOUNTER FOR EXAMINATION FOLLOWING MOTOR VEHICLE COLLISION (MVC): ICD-10-CM

## 2023-02-25 DIAGNOSIS — S46.911A ELBOW STRAIN, RIGHT, INITIAL ENCOUNTER: Primary | ICD-10-CM

## 2023-02-25 DIAGNOSIS — S63.91XA HAND SPRAIN, RIGHT, INITIAL ENCOUNTER: ICD-10-CM

## 2023-02-25 PROCEDURE — 73130 X-RAY EXAM OF HAND: CPT

## 2023-02-25 PROCEDURE — 99283 EMERGENCY DEPT VISIT LOW MDM: CPT

## 2023-02-25 PROCEDURE — 73080 X-RAY EXAM OF ELBOW: CPT

## 2023-02-27 ENCOUNTER — OFFICE VISIT (OUTPATIENT)
Dept: FAMILY MEDICINE CLINIC | Facility: CLINIC | Age: 33
End: 2023-02-27
Payer: COMMERCIAL

## 2023-02-27 ENCOUNTER — HOSPITAL ENCOUNTER (OUTPATIENT)
Dept: GENERAL RADIOLOGY | Facility: HOSPITAL | Age: 33
Discharge: HOME | End: 2023-02-27
Admitting: INTERNAL MEDICINE
Payer: COMMERCIAL

## 2023-02-27 VITALS
HEART RATE: 100 BPM | WEIGHT: 178 LBS | SYSTOLIC BLOOD PRESSURE: 122 MMHG | BODY MASS INDEX: 24.92 KG/M2 | DIASTOLIC BLOOD PRESSURE: 84 MMHG | OXYGEN SATURATION: 98 % | HEIGHT: 71 IN

## 2023-02-27 DIAGNOSIS — M54.2 NECK PAIN: ICD-10-CM

## 2023-02-27 DIAGNOSIS — Z00.00 PREVENTATIVE HEALTH CARE: Primary | ICD-10-CM

## 2023-02-27 DIAGNOSIS — Z11.3 SCREENING EXAMINATION FOR STD (SEXUALLY TRANSMITTED DISEASE): ICD-10-CM

## 2023-02-27 DIAGNOSIS — S13.4XXA WHIPLASH INJURY TO NECK, INITIAL ENCOUNTER: ICD-10-CM

## 2023-02-27 PROCEDURE — 99395 PREV VISIT EST AGE 18-39: CPT | Performed by: INTERNAL MEDICINE

## 2023-02-27 PROCEDURE — 72040 X-RAY EXAM NECK SPINE 2-3 VW: CPT

## 2023-02-27 RX ORDER — CYCLOBENZAPRINE HCL 10 MG
10 TABLET ORAL NIGHTLY PRN
Qty: 10 TABLET | Refills: 0 | Status: SHIPPED | OUTPATIENT
Start: 2023-02-27 | End: 2023-03-13 | Stop reason: SDUPTHER

## 2023-02-27 RX ORDER — METHYLPREDNISOLONE 4 MG/1
TABLET ORAL
Qty: 21 TABLET | Refills: 0 | Status: SHIPPED | OUTPATIENT
Start: 2023-02-27

## 2023-02-27 RX ORDER — NAPROXEN 500 MG/1
500 TABLET ORAL 2 TIMES DAILY WITH MEALS
Qty: 14 TABLET | Refills: 0 | Status: SHIPPED | OUTPATIENT
Start: 2023-02-27 | End: 2023-03-06
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
## 2023-03-13 ENCOUNTER — OFFICE VISIT (OUTPATIENT)
Dept: FAMILY MEDICINE CLINIC | Facility: CLINIC | Age: 33
End: 2023-03-13
Payer: COMMERCIAL

## 2023-03-13 VITALS
OXYGEN SATURATION: 98 % | TEMPERATURE: 98.3 F | BODY MASS INDEX: 24.72 KG/M2 | HEART RATE: 97 BPM | WEIGHT: 176.6 LBS | SYSTOLIC BLOOD PRESSURE: 128 MMHG | DIASTOLIC BLOOD PRESSURE: 74 MMHG | HEIGHT: 71 IN

## 2023-03-13 DIAGNOSIS — G89.29 CHRONIC PAIN OF RIGHT KNEE: ICD-10-CM

## 2023-03-13 DIAGNOSIS — M54.2 NECK PAIN: Primary | ICD-10-CM

## 2023-03-13 DIAGNOSIS — M54.50 LOW BACK PAIN, UNSPECIFIED BACK PAIN LATERALITY, UNSPECIFIED CHRONICITY, UNSPECIFIED WHETHER SCIATICA PRESENT: ICD-10-CM

## 2023-03-13 DIAGNOSIS — M25.561 CHRONIC PAIN OF RIGHT KNEE: ICD-10-CM

## 2023-03-13 PROCEDURE — 99214 OFFICE O/P EST MOD 30 MIN: CPT | Performed by: INTERNAL MEDICINE

## 2023-03-13 RX ORDER — CYCLOBENZAPRINE HCL 10 MG
10 TABLET ORAL NIGHTLY PRN
Qty: 5 TABLET | Refills: 0 | Status: SHIPPED | OUTPATIENT
Start: 2023-03-13
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
## 2023-04-12 ENCOUNTER — PATIENT MESSAGE (OUTPATIENT)
Dept: FAMILY MEDICINE CLINIC | Facility: CLINIC | Age: 33
End: 2023-04-12
Payer: COMMERCIAL

## 2023-04-13 ENCOUNTER — PATIENT MESSAGE (OUTPATIENT)
Dept: FAMILY MEDICINE CLINIC | Facility: CLINIC | Age: 33
End: 2023-04-13
Payer: COMMERCIAL

## 2023-05-15 ENCOUNTER — OFFICE VISIT (OUTPATIENT)
Dept: FAMILY MEDICINE CLINIC | Facility: CLINIC | Age: 33
End: 2023-05-15
Payer: COMMERCIAL

## 2023-05-15 VITALS
HEART RATE: 95 BPM | WEIGHT: 178 LBS | HEIGHT: 71 IN | OXYGEN SATURATION: 99 % | BODY MASS INDEX: 24.92 KG/M2 | SYSTOLIC BLOOD PRESSURE: 120 MMHG | DIASTOLIC BLOOD PRESSURE: 86 MMHG

## 2023-05-15 DIAGNOSIS — M54.2 NECK PAIN: ICD-10-CM

## 2023-05-15 DIAGNOSIS — M54.50 LOW BACK PAIN, UNSPECIFIED BACK PAIN LATERALITY, UNSPECIFIED CHRONICITY, UNSPECIFIED WHETHER SCIATICA PRESENT: ICD-10-CM
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
## 2025-03-08 ENCOUNTER — HOSPITAL ENCOUNTER (EMERGENCY)
Facility: HOSPITAL | Age: 35
Discharge: HOME | End: 2025-03-08
Attending: EMERGENCY MEDICINE
Payer: COMMERCIAL

## 2025-03-08 VITALS
RESPIRATION RATE: 16 BRPM | OXYGEN SATURATION: 98 % | WEIGHT: 173.28 LBS | HEART RATE: 58 BPM | BODY MASS INDEX: 24.26 KG/M2 | SYSTOLIC BLOOD PRESSURE: 102 MMHG | DIASTOLIC BLOOD PRESSURE: 80 MMHG | HEIGHT: 71 IN | TEMPERATURE: 98.1 F

## 2025-03-08 DIAGNOSIS — T78.40XA ALLERGIC REACTION, INITIAL ENCOUNTER: Primary | ICD-10-CM

## 2025-03-08 DIAGNOSIS — L30.9 ECZEMA, UNSPECIFIED TYPE: ICD-10-CM

## 2025-03-08 LAB
HOLD SPECIMEN: NORMAL
WHOLE BLOOD HOLD COAG: NORMAL
WHOLE BLOOD HOLD SPECIMEN: NORMAL

## 2025-03-08 PROCEDURE — 96375 TX/PRO/DX INJ NEW DRUG ADDON: CPT

## 2025-03-08 PROCEDURE — 25010000002 METHYLPREDNISOLONE PER 125 MG: Performed by: EMERGENCY MEDICINE

## 2025-03-08 PROCEDURE — 99283 EMERGENCY DEPT VISIT LOW MDM: CPT

## 2025-03-08 PROCEDURE — 25010000002 DIPHENHYDRAMINE PER 50 MG: Performed by: EMERGENCY MEDICINE

## 2025-03-08 PROCEDURE — 96374 THER/PROPH/DIAG INJ IV PUSH: CPT

## 2025-03-08 RX ORDER — FERROUS SULFATE 325(65) MG
325 TABLET ORAL
COMMUNITY
End: 2025-03-08

## 2025-03-08 RX ORDER — SODIUM CHLORIDE 0.9 % (FLUSH) 0.9 %
10 SYRINGE (ML) INJECTION AS NEEDED
Status: DISCONTINUED | OUTPATIENT
Start: 2025-03-08 | End: 2025-03-08 | Stop reason: HOSPADM

## 2025-03-08 RX ORDER — PREDNISONE 20 MG/1
TABLET ORAL
Qty: 10 TABLET | Refills: 0 | Status: SHIPPED | OUTPATIENT
Start: 2025-03-08

## 2025-03-08 RX ORDER — EPINEPHRINE 0.3 MG/.3ML
0.3 INJECTION SUBCUTANEOUS ONCE
Qty: 1 EACH | Refills: 0 | Status: SHIPPED | OUTPATIENT
Start: 2025-03-08 | End: 2025-03-08

## 2025-03-08 RX ORDER — FAMOTIDINE 10 MG/ML
20 INJECTION, SOLUTION INTRAVENOUS ONCE
Status: COMPLETED | OUTPATIENT
Start: 2025-03-08 | End: 2025-03-08

## 2025-03-08 RX ORDER — METHYLPREDNISOLONE SODIUM SUCCINATE 125 MG/2ML
125 INJECTION, POWDER, LYOPHILIZED, FOR SOLUTION INTRAMUSCULAR; INTRAVENOUS ONCE
Status: COMPLETED | OUTPATIENT
Start: 2025-03-08 | End: 2025-03-08

## 2025-03-08 RX ORDER — DIPHENHYDRAMINE HYDROCHLORIDE 50 MG/ML
25 INJECTION INTRAMUSCULAR; INTRAVENOUS ONCE
Status: COMPLETED | OUTPATIENT
Start: 2025-03-08 | End: 2025-03-08

## 2025-03-08 RX ADMIN — DIPHENHYDRAMINE HYDROCHLORIDE 25 MG: 50 INJECTION INTRAMUSCULAR; INTRAVENOUS at 06:49

## 2025-03-08 RX ADMIN — METHYLPREDNISOLONE SODIUM SUCCINATE 125 MG: 125 INJECTION INTRAMUSCULAR; INTRAVENOUS at 06:48

## 2025-03-08 RX ADMIN — FAMOTIDINE 20 MG: 10 INJECTION, SOLUTION INTRAVENOUS at 06:49

## 2025-03-08 NOTE — DISCHARGE INSTRUCTIONS
Vaseline to your neck before showering.  Over-the-counter hydrocortisone cream after showering and twice a day.  I would recommend a nondrowsy long-acting antihistamine daily such as Zyrtec.  This is available over-the-counter.  Stop taking your iron supplement to see if that helps.  I have sent in a prescription for an EpiPen.  If you have to use that then you must call 911.  Use it only if swelling inside your throat, difficulty breathing or swallowing.  Call your primary care provider for further evaluation and consideration of referral to an allergist.

## 2025-03-08 NOTE — ED PROVIDER NOTES
Subjective   History of Present Illness  Mr Goel presents with itching and swelling of his face.  Reports for the last month and a half has had itching and swelling of his anterior throat.  Denies difficulty swallowing or shortness of breath.  Forearms have been itching intensely.  He tells me over the last few nights he has had burning and itching in his eyes and puffiness.  Has taken Benadryl the last couple of nights.  Thought it was cologne, quit wearing it.  Works at [employer], wears gloves and sleeves at work.      Review of Systems    Past Medical History:   Diagnosis Date    Anemia     low iron        No Known Allergies    No past surgical history on file.    Family History   Problem Relation Age of Onset    Arthritis Maternal Grandmother     Cancer Maternal Grandmother     Arthritis Paternal Grandmother     Cancer Paternal Grandmother     Breast cancer Neg Hx     Diabetes Neg Hx     Heart attack Neg Hx     Hypertension Neg Hx     Migraines Neg Hx     Stroke Neg Hx        Social History     Socioeconomic History    Marital status: Single   Tobacco Use    Smoking status: Never     Passive exposure: Never    Smokeless tobacco: Never   Vaping Use    Vaping status: Never Used   Substance and Sexual Activity    Alcohol use: Yes     Alcohol/week: 4.0 standard drinks of alcohol     Types: 2 Cans of beer, 2 Shots of liquor per week     Comment: Beer 2-3 times a year    Drug use: Never    Sexual activity: Yes     Partners: Female     Birth control/protection: Condom, I.U.D.           Objective   Physical Exam  Vitals and nursing note reviewed.   Constitutional:       General: He is not in acute distress.     Comments: Pleasant young man in no distress   HENT:      Nose: Nose normal. No congestion.      Mouth/Throat:      Mouth: Mucous membranes are moist.      Pharynx: No posterior oropharyngeal erythema.      Comments: No swelling of his tongue or posterior pharynx.  Uvula is normal  Eyes:      Conjunctiva/sclera:  Conjunctivae normal.   Neck:      Comments: Skin is erythematous and thickened over the anterior neck.  Cardiovascular:      Rate and Rhythm: Normal rate and regular rhythm.      Heart sounds: No murmur heard.  Pulmonary:      Effort: Pulmonary effort is normal.      Breath sounds: Normal breath sounds. No wheezing or rales.   Musculoskeletal:      Cervical back: Normal range of motion and neck supple.   Skin:     Findings: Erythema and rash present.   Neurological:      Mental Status: He is alert.         Procedures           ED Course  ED Course as of 03/08/25 1100   Sat Mar 08, 2025   0825 Remains comfortable.  Spoke with him about possible allergens.  Takes iron supplements daily.  Have recommended he stop those.  Will place him on prednisone.  Spoke with him about need to follow-up with an allergist and I recommended he call his PCP to arrange [DT]      ED Course User Index  [DT] Howard Islas MD                                                       Medical Decision Making  Gave several IV medications and observed him for a couple of hours and had reevaluation    Problems Addressed:  Allergic reaction, initial encounter: complicated acute illness or injury that poses a threat to life or bodily functions  Eczema, unspecified type: complicated acute illness or injury    Risk  Prescription drug management.        Final diagnoses:   Allergic reaction, initial encounter   Eczema, unspecified type       ED Disposition  ED Disposition       ED Disposition   Discharge    Condition   Stable    Comment   --               Mateo Barrera, DO  [address] [id number] [phone]               Medication List        New Prescriptions      EPINEPHrine 0.3 MG/0.3ML solution auto-injector injection  Commonly known as: EPIPEN  Inject 0.3 mL under the skin into the appropriate area as directed 1 (One) Time for 1 dose.     predniSONE 20 MG tablet  Commonly known as: DELTASONE  2 pills daily for 5  days            Stop      cyclobenzaprine 10 MG tablet  Commonly known as: FLEXERIL     ferrous sulfate 325 (65 FE) MG tablet               Where to Get Your Medications        These medications were sent to [facility] [id number] - [place], [place] - [address]  AT [facility] [address] - [phone] PH - [phone]   [address] , [facility] [address]      Phone: [phone]   EPINEPHrine 0.3 MG/0.3ML solution auto-injector injection  predniSONE 20 MG tablet            Howard Islas MD  03/08/25 1100

## 2025-03-17 ENCOUNTER — TELEPHONE (OUTPATIENT)
Age: 35
End: 2025-03-17
Payer: COMMERCIAL

## 2025-03-17 NOTE — TELEPHONE ENCOUNTER
Called patient to reschedule 3/24 NP appt. No answer; left vm to call back.     **HUB okay to relay and reschedule to next available**

## 2025-03-28 ENCOUNTER — OFFICE VISIT (OUTPATIENT)
Age: 35
End: 2025-03-28
Payer: COMMERCIAL

## 2025-03-28 ENCOUNTER — LAB (OUTPATIENT)
Age: 35
End: 2025-03-28
Payer: COMMERCIAL

## 2025-03-28 ENCOUNTER — PATIENT ROUNDING (BHMG ONLY) (OUTPATIENT)
Age: 35
End: 2025-03-28
Payer: COMMERCIAL

## 2025-03-28 VITALS
SYSTOLIC BLOOD PRESSURE: 98 MMHG | DIASTOLIC BLOOD PRESSURE: 62 MMHG | HEART RATE: 76 BPM | HEIGHT: 71 IN | WEIGHT: 165.1 LBS | OXYGEN SATURATION: 97 % | BODY MASS INDEX: 23.11 KG/M2

## 2025-03-28 DIAGNOSIS — R21 RASH: Primary | ICD-10-CM

## 2025-03-28 DIAGNOSIS — E78.2 MIXED HYPERLIPIDEMIA: ICD-10-CM

## 2025-03-28 DIAGNOSIS — R73.9 HYPERGLYCEMIA: ICD-10-CM

## 2025-03-28 DIAGNOSIS — L50.8 CHRONIC URTICARIA: ICD-10-CM

## 2025-03-28 DIAGNOSIS — R21 RASH: ICD-10-CM

## 2025-03-28 LAB
BASOPHILS # BLD AUTO: 0.04 10*3/MM3 (ref 0–0.2)
BASOPHILS NFR BLD AUTO: 0.8 % (ref 0–1.5)
DEPRECATED RDW RBC AUTO: 40.6 FL (ref 37–54)
EOSINOPHIL # BLD AUTO: 0.15 10*3/MM3 (ref 0–0.4)
EOSINOPHIL NFR BLD AUTO: 2.9 % (ref 0.3–6.2)
ERYTHROCYTE [DISTWIDTH] IN BLOOD BY AUTOMATED COUNT: 13.3 % (ref 12.3–15.4)
HCT VFR BLD AUTO: 43.6 % (ref 37.5–51)
HGB BLD-MCNC: 14.8 G/DL (ref 13–17.7)
IMM GRANULOCYTES # BLD AUTO: 0.01 10*3/MM3 (ref 0–0.05)
IMM GRANULOCYTES NFR BLD AUTO: 0.2 % (ref 0–0.5)
LYMPHOCYTES # BLD AUTO: 1.39 10*3/MM3 (ref 0.7–3.1)
LYMPHOCYTES NFR BLD AUTO: 26.6 % (ref 19.6–45.3)
MCH RBC QN AUTO: 28.6 PG (ref 26.6–33)
MCHC RBC AUTO-ENTMCNC: 33.9 G/DL (ref 31.5–35.7)
MCV RBC AUTO: 84.3 FL (ref 79–97)
MONOCYTES # BLD AUTO: 0.44 10*3/MM3 (ref 0.1–0.9)
MONOCYTES NFR BLD AUTO: 8.4 % (ref 5–12)
NEUTROPHILS NFR BLD AUTO: 3.19 10*3/MM3 (ref 1.7–7)
NEUTROPHILS NFR BLD AUTO: 61.1 % (ref 42.7–76)
NRBC BLD AUTO-RTO: 0 /100 WBC (ref 0–0.2)
PLATELET # BLD AUTO: 210 10*3/MM3 (ref 140–450)
PMV BLD AUTO: 10.6 FL (ref 6–12)
RBC # BLD AUTO: 5.17 10*6/MM3 (ref 4.14–5.8)
WBC NRBC COR # BLD AUTO: 5.22 10*3/MM3 (ref 3.4–10.8)

## 2025-03-28 PROCEDURE — 86431 RHEUMATOID FACTOR QUANT: CPT | Performed by: STUDENT IN AN ORGANIZED HEALTH CARE EDUCATION/TRAINING PROGRAM

## 2025-03-28 PROCEDURE — 36415 COLL VENOUS BLD VENIPUNCTURE: CPT | Performed by: STUDENT IN AN ORGANIZED HEALTH CARE EDUCATION/TRAINING PROGRAM

## 2025-03-28 PROCEDURE — 80050 GENERAL HEALTH PANEL: CPT | Performed by: STUDENT IN AN ORGANIZED HEALTH CARE EDUCATION/TRAINING PROGRAM

## 2025-03-28 PROCEDURE — 86140 C-REACTIVE PROTEIN: CPT | Performed by: STUDENT IN AN ORGANIZED HEALTH CARE EDUCATION/TRAINING PROGRAM

## 2025-03-28 PROCEDURE — 80061 LIPID PANEL: CPT | Performed by: STUDENT IN AN ORGANIZED HEALTH CARE EDUCATION/TRAINING PROGRAM

## 2025-03-28 PROCEDURE — 83036 HEMOGLOBIN GLYCOSYLATED A1C: CPT | Performed by: STUDENT IN AN ORGANIZED HEALTH CARE EDUCATION/TRAINING PROGRAM

## 2025-03-28 RX ORDER — HYDROCORTISONE 25 MG/G
1 OINTMENT TOPICAL 2 TIMES DAILY
COMMUNITY
Start: 2025-03-21

## 2025-03-28 RX ORDER — TRIAMCINOLONE ACETONIDE 1 MG/G
1 OINTMENT TOPICAL 2 TIMES DAILY
COMMUNITY
Start: 2025-03-21

## 2025-03-28 NOTE — PROGRESS NOTES
Office Note     Name: Sonny Goel    : 1990     MRN: [id number]     Chief Complaint  Establish Care and Nose Bleed (Bleeds on left side, blowing nose and sometimes just touching his nose)    Subjective     History of Present Illness:  Sonny Goel is a 34 y.o. male who presents today for initial visit to establish care. Primary concern is what sounds like a severe allergic reaction.  He has had chronic skin issues for about a year and reports burning cracked red skin on hands chest neck, eyelids and ears. Also has easy nosebleeds.  Planning patch test with derm. He does take OTC antihistamines.  Symptoms improving with topical steroids and oral H1 blocker    No other concerns at this time.      Past Medical History:   Past Medical History:   Diagnosis Date    Anemia     low iron     Asthma        Past Surgical History: History reviewed. No pertinent surgical history.    Immunizations:   Immunization History   Administered Date(s) Administered    COVID-19 (PFIZER) Purple Cap Monovalent 2021, 2021    Hep A, 2 Dose 2009, 2010    Tdap 2025    Varicella 2009        Medications:     Current Outpatient Medications:     hydrocortisone 2.5 % ointment, Apply 1 Application topically to the appropriate area as directed 2 (Two) Times a Day., Disp: , Rfl:     triamcinolone (KENALOG) 0.1 % ointment, Apply 1 Application topically to the appropriate area as directed 2 (Two) Times a Day., Disp: , Rfl:     Multiple Vitamins-Minerals (MULTIVITAMIN GUMMIES ADULT PO), Take 2 each by mouth Daily. (Patient not taking: Reported on 3/28/2025), Disp: , Rfl:     predniSONE (DELTASONE) 20 MG tablet, 2 pills daily for 5 days (Patient not taking: Reported on 3/28/2025), Disp: 10 tablet, Rfl: 0    Allergies:   No Known Allergies    Family History:   Family History   Problem Relation Age of Onset    Arthritis Maternal Grandmother     Cancer Maternal Grandmother     Diabetes  "Maternal Grandmother     Arthritis Paternal Grandmother     Cancer Paternal Grandmother     Diabetes Paternal Grandmother     Breast cancer Neg Hx     Heart attack Neg Hx     Hypertension Neg Hx     Migraines Neg Hx     Stroke Neg Hx        Social History:   Social History     Socioeconomic History    Marital status: Single   Tobacco Use    Smoking status: Never     Passive exposure: Never    Smokeless tobacco: Never   Vaping Use    Vaping status: Never Used   Substance and Sexual Activity    Alcohol use: Not Currently     Alcohol/week: 4.0 standard drinks of alcohol     Types: 2 Cans of beer, 2 Shots of liquor per week     Comment: Beer 2-3 times a year    Drug use: Never    Sexual activity: Yes     Partners: Female     Birth control/protection: Condom, I.U.D.         Objective     Vital Signs  BP 98/62 (BP Location: Left arm, Patient Position: Sitting, Cuff Size: Adult)   Pulse 76   Ht 180.4 cm (71.02\")   Wt 74.9 kg (165 lb 1.6 oz)   SpO2 97%   BMI 23.01 kg/m²   Estimated body mass index is 23.01 kg/m² as calculated from the following:    Height as of this encounter: 180.4 cm (71.02\").    Weight as of this encounter: 74.9 kg (165 lb 1.6 oz).    BMI is within normal parameters. No other follow-up for BMI required.      Physical Exam  Constitutional:       General: He is not in acute distress.     Appearance: He is not toxic-appearing.   Cardiovascular:      Rate and Rhythm: Normal rate and regular rhythm.      Heart sounds: No murmur heard.     No friction rub. No gallop.   Pulmonary:      Effort: Pulmonary effort is normal.      Breath sounds: Normal breath sounds.   Abdominal:      General: Abdomen is flat. There is no distension.   Skin:     General: Skin is warm and dry.   Neurological:      Mental Status: He is alert.   Psychiatric:         Mood and Affect: Mood normal.         Behavior: Behavior normal.          Assessment and Plan     1. Rash  Check labs, extensive/severe rash noted  - TSH Rfx On " Abnormal To Free T4; Future  - RONEY With / DsDNA, RNP, Sjogrens A / B, Smith; Future  - C-reactive Protein; Future  - Rheumatoid Factor; Future  - CBC Auto Differential; Future  - Comprehensive Metabolic Panel; Future    2. Mixed hyperlipidemia  Check labs  - Lipid Panel; Future    3. Hyperglycemia  Check labs  - Hemoglobin A1c; Future    4. Chronic urticaria  Can increase antihistamine to one pill twice a day  Agree with patch testing.         Counseling was given to patient for the following topics: instructions for management.    Follow Up  Return in about 4 months (around 7/28/2025) for Annual physical.    Sven Coelho MD  [facility]  [address] [address] [phone]

## 2025-03-28 NOTE — LETTER
[facility]  Vaccine Consent Form    Patient Name:  Sonny Goel  Patient :  1990     Vaccine(s) Ordered    Tdap Vaccine => 8yo IM (BOOSTRIX/ADACEL)        Screening Checklist  The following questions should be completed prior to vaccination. If you answer “yes” to any question, it does not necessarily mean you should not be vaccinated. It just means we may need to clarify or ask more questions. If a question is unclear, please ask your healthcare provider to explain it.    Yes No   Any fever or moderate to severe illness today (mild illness and/or antibiotic treatment are not contraindications)?     Do you have a history of a serious reaction to any previous vaccinations, such as anaphylaxis, encephalopathy within 7 days, Guillain-Koloa syndrome within 6 weeks, seizure?     Have you received any live vaccine(s) (e.g MMR, JH) or any other vaccines in the last month (to ensure duplicate doses aren't given)?     Do you have an anaphylactic allergy to latex (DTaP, DTaP-IPV, Hep A, Hep B, MenB, RV, Td, Tdap), baker’s yeast (Hep B, HPV), polysorbates (RSV, nirsevimab, PCV 20, Rotavirrus, Tdap, Shingrix), or gelatin (JH, MMR)?     Do you have an anaphylactic allergy to neomycin (Rabies, JH, MMR, IPV, Hep A), polymyxin B (IPV), or streptomycin (IPV)?      Any cancer, leukemia, AIDS, or other immune system disorder? (JH, MMR, RV)     Do you have a parent, brother, or sister with an immune system problem (if immune competence of vaccine recipient clinically verified, can proceed)? (MMR, JH)     Any recent steroid treatments for >2 weeks, chemotherapy, or radiation treatment? (JH, MMR)     Have you received antibody-containing blood transfusions or IVIG in the past 11 months (recommended interval is dependent on product)? (MMR, JH)     Have you taken antiviral drugs (acyclovir, famciclovir, valacyclovir for JH) in the last 24 or 48 hours, respectively?      Are you pregnant or planning to become  "pregnant within 1 month? (JH, MMR, HPV, IPV, MenB, Abrexvy; For Hep B- refer to Engerix-B; For RSV - Abrysvo is indicated for 32-36 weeks of pregnancy from September to January)     For infants, have you ever been told your child has had intussusception or a medical emergency involving obstruction of the intestine (Rotavirus)? If not for an infant, can skip this question.         *Ordering Physicians/APC should be consulted if \"yes\" is checked by the patient or guardian above.  I have received, read, and understand the Vaccine Information Statement (VIS) for each vaccine ordered.  I have considered my or my child's health status as well as the health status of my close contacts.  I have taken the opportunity to discuss my vaccine questions with my or my child's health care provider.   I have requested that the ordered vaccine(s) be given to me or my child.  I understand the benefits and risks of the vaccines.  I understand that I should remain in the clinic for 15 minutes after receiving the vaccine(s).  _________________________________________________________  Signature of Patient or Parent/Legal Guardian ____________________  Date     "

## 2025-03-28 NOTE — PATIENT INSTRUCTIONS
Take loratadine 10 mg 1-2 times per day or cetirizine 10 mg 1-2 times per day over the counter.  Use nasal saline spray as needed after exposure to allergens or when symptoms arise as needed

## 2025-03-29 LAB
ALBUMIN SERPL-MCNC: 4.2 G/DL (ref 3.5–5.2)
ALBUMIN/GLOB SERPL: 1.4 G/DL
ALP SERPL-CCNC: 86 U/L (ref 39–117)
ALT SERPL W P-5'-P-CCNC: 14 U/L (ref 1–41)
ANION GAP SERPL CALCULATED.3IONS-SCNC: 8.8 MMOL/L (ref 5–15)
AST SERPL-CCNC: 22 U/L (ref 1–40)
BILIRUB SERPL-MCNC: 0.7 MG/DL (ref 0–1.2)
BUN SERPL-MCNC: 14 MG/DL (ref 6–20)
BUN/CREAT SERPL: 14.3 (ref 7–25)
CALCIUM SPEC-SCNC: 9.7 MG/DL (ref 8.6–10.5)
CHLORIDE SERPL-SCNC: 104 MMOL/L (ref 98–107)
CHOLEST SERPL-MCNC: 203 MG/DL (ref 0–200)
CHROMATIN AB SERPL-ACNC: 10.8 IU/ML (ref 0–14)
CO2 SERPL-SCNC: 27.2 MMOL/L (ref 22–29)
CREAT SERPL-MCNC: 0.98 MG/DL (ref 0.76–1.27)
CRP SERPL-MCNC: <0.3 MG/DL (ref 0–0.5)
EGFRCR SERPLBLD CKD-EPI 2021: 105.1 ML/MIN/1.73
GLOBULIN UR ELPH-MCNC: 3 GM/DL
GLUCOSE SERPL-MCNC: 97 MG/DL (ref 65–99)
HBA1C MFR BLD: 5.5 % (ref 4.8–5.6)
HDLC SERPL-MCNC: 40 MG/DL (ref 40–60)
LDLC SERPL CALC-MCNC: 144 MG/DL (ref 0–100)
LDLC/HDLC SERPL: 3.55 {RATIO}
POTASSIUM SERPL-SCNC: 4.2 MMOL/L (ref 3.5–5.2)
PROT SERPL-MCNC: 7.2 G/DL (ref 6–8.5)
SODIUM SERPL-SCNC: 140 MMOL/L (ref 136–145)
TRIGL SERPL-MCNC: 106 MG/DL (ref 0–150)
TSH SERPL DL<=0.05 MIU/L-ACNC: 0.38 UIU/ML (ref 0.27–4.2)
VLDLC SERPL-MCNC: 19 MG/DL (ref 5–40)

## 2025-03-31 ENCOUNTER — RESULTS FOLLOW-UP (OUTPATIENT)
Age: 35
End: 2025-03-31
Payer: COMMERCIAL

## 2025-03-31 DIAGNOSIS — R21 RASH: Primary | ICD-10-CM

## 2025-03-31 DIAGNOSIS — R76.8 POSITIVE ANA (ANTINUCLEAR ANTIBODY): ICD-10-CM

## 2025-03-31 LAB
ANA SER QL: POSITIVE
CENTROMERE B AB SER-ACNC: <0.2 AI (ref 0–0.9)
CHROMATIN AB SERPL-ACNC: <0.2 AI (ref 0–0.9)
DSDNA AB SER-ACNC: <1 IU/ML (ref 0–9)
ENA JO1 AB SER-ACNC: <0.2 AI (ref 0–0.9)
ENA RNP AB SER-ACNC: <0.2 AI (ref 0–0.9)
ENA SCL70 AB SER-ACNC: <0.2 AI (ref 0–0.9)
ENA SM AB SER-ACNC: <0.2 AI (ref 0–0.9)
ENA SS-A AB SER-ACNC: <0.2 AI (ref 0–0.9)
ENA SS-B AB SER-ACNC: 2 AI (ref 0–0.9)
Lab: ABNORMAL

## 2025-04-30 ENCOUNTER — HOSPITAL ENCOUNTER (EMERGENCY)
Facility: HOSPITAL | Age: 35
Discharge: HOME | End: 2025-04-30
Attending: EMERGENCY MEDICINE
Payer: COMMERCIAL

## 2025-04-30 VITALS
TEMPERATURE: 98.2 F | RESPIRATION RATE: 16 BRPM | WEIGHT: 166 LBS | HEIGHT: 72 IN | HEART RATE: 69 BPM | SYSTOLIC BLOOD PRESSURE: 109 MMHG | DIASTOLIC BLOOD PRESSURE: 65 MMHG | OXYGEN SATURATION: 96 % | BODY MASS INDEX: 22.48 KG/M2

## 2025-04-30 DIAGNOSIS — L50.9 HIVES: ICD-10-CM

## 2025-04-30 DIAGNOSIS — R21 FACIAL RASH: ICD-10-CM

## 2025-04-30 DIAGNOSIS — T78.40XA ALLERGIC REACTION, INITIAL ENCOUNTER: Primary | ICD-10-CM

## 2025-04-30 LAB
ALBUMIN SERPL-MCNC: 4.1 G/DL (ref 3.5–5.2)
ALBUMIN/GLOB SERPL: 1.6 G/DL
ALP SERPL-CCNC: 85 U/L (ref 39–117)
ALT SERPL W P-5'-P-CCNC: 14 U/L (ref 1–41)
ANION GAP SERPL CALCULATED.3IONS-SCNC: 10 MMOL/L (ref 5–15)
AST SERPL-CCNC: 23 U/L (ref 1–40)
BASOPHILS # BLD AUTO: 0.04 10*3/MM3 (ref 0–0.2)
BASOPHILS NFR BLD AUTO: 0.7 % (ref 0–1.5)
BILIRUB SERPL-MCNC: 0.8 MG/DL (ref 0–1.2)
BUN SERPL-MCNC: 12 MG/DL (ref 6–20)
BUN/CREAT SERPL: 13.5 (ref 7–25)
CALCIUM SPEC-SCNC: 9.2 MG/DL (ref 8.6–10.5)
CHLORIDE SERPL-SCNC: 103 MMOL/L (ref 98–107)
CO2 SERPL-SCNC: 27 MMOL/L (ref 22–29)
CREAT SERPL-MCNC: 0.89 MG/DL (ref 0.76–1.27)
DEPRECATED RDW RBC AUTO: 40 FL (ref 37–54)
EGFRCR SERPLBLD CKD-EPI 2021: 116.8 ML/MIN/1.73
EOSINOPHIL # BLD AUTO: 0.12 10*3/MM3 (ref 0–0.4)
EOSINOPHIL NFR BLD AUTO: 2.1 % (ref 0.3–6.2)
ERYTHROCYTE [DISTWIDTH] IN BLOOD BY AUTOMATED COUNT: 13 % (ref 12.3–15.4)
GLOBULIN UR ELPH-MCNC: 2.5 GM/DL
GLUCOSE SERPL-MCNC: 90 MG/DL (ref 65–99)
HCT VFR BLD AUTO: 42.6 % (ref 37.5–51)
HGB BLD-MCNC: 14.1 G/DL (ref 13–17.7)
IMM GRANULOCYTES # BLD AUTO: 0.02 10*3/MM3 (ref 0–0.05)
IMM GRANULOCYTES NFR BLD AUTO: 0.4 % (ref 0–0.5)
LYMPHOCYTES # BLD AUTO: 1.32 10*3/MM3 (ref 0.7–3.1)
LYMPHOCYTES NFR BLD AUTO: 23.4 % (ref 19.6–45.3)
MCH RBC QN AUTO: 28.2 PG (ref 26.6–33)
MCHC RBC AUTO-ENTMCNC: 33.1 G/DL (ref 31.5–35.7)
MCV RBC AUTO: 85.2 FL (ref 79–97)
MONOCYTES # BLD AUTO: 0.49 10*3/MM3 (ref 0.1–0.9)
MONOCYTES NFR BLD AUTO: 8.7 % (ref 5–12)
NEUTROPHILS NFR BLD AUTO: 3.64 10*3/MM3 (ref 1.7–7)
NEUTROPHILS NFR BLD AUTO: 64.7 % (ref 42.7–76)
NRBC BLD AUTO-RTO: 0 /100 WBC (ref 0–0.2)
PLATELET # BLD AUTO: 169 10*3/MM3 (ref 140–450)
PMV BLD AUTO: 10.4 FL (ref 6–12)
POTASSIUM SERPL-SCNC: 3.8 MMOL/L (ref 3.5–5.2)
PROT SERPL-MCNC: 6.6 G/DL (ref 6–8.5)
RBC # BLD AUTO: 5 10*6/MM3 (ref 4.14–5.8)
SODIUM SERPL-SCNC: 140 MMOL/L (ref 136–145)
WBC NRBC COR # BLD AUTO: 5.63 10*3/MM3 (ref 3.4–10.8)

## 2025-04-30 PROCEDURE — 25010000002 DEXAMETHASONE PER 1 MG: Performed by: PHYSICIAN ASSISTANT

## 2025-04-30 PROCEDURE — 99283 EMERGENCY DEPT VISIT LOW MDM: CPT

## 2025-04-30 PROCEDURE — 85025 COMPLETE CBC W/AUTO DIFF WBC: CPT | Performed by: PHYSICIAN ASSISTANT

## 2025-04-30 PROCEDURE — 96375 TX/PRO/DX INJ NEW DRUG ADDON: CPT

## 2025-04-30 PROCEDURE — 96374 THER/PROPH/DIAG INJ IV PUSH: CPT

## 2025-04-30 PROCEDURE — 80053 COMPREHEN METABOLIC PANEL: CPT | Performed by: PHYSICIAN ASSISTANT

## 2025-04-30 PROCEDURE — 25010000002 DIPHENHYDRAMINE PER 50 MG: Performed by: PHYSICIAN ASSISTANT

## 2025-04-30 RX ORDER — DIPHENHYDRAMINE HYDROCHLORIDE 50 MG/ML
25 INJECTION, SOLUTION INTRAMUSCULAR; INTRAVENOUS ONCE
Status: COMPLETED | OUTPATIENT
Start: 2025-04-30 | End: 2025-04-30

## 2025-04-30 RX ORDER — TETRACAINE HYDROCHLORIDE 5 MG/ML
2 SOLUTION OPHTHALMIC ONCE
Status: COMPLETED | OUTPATIENT
Start: 2025-04-30 | End: 2025-04-30

## 2025-04-30 RX ORDER — DEXAMETHASONE SODIUM PHOSPHATE 10 MG/ML
10 INJECTION, SOLUTION INTRA-ARTICULAR; INTRALESIONAL; INTRAMUSCULAR; INTRAVENOUS; SOFT TISSUE ONCE
Status: COMPLETED | OUTPATIENT
Start: 2025-04-30 | End: 2025-04-30

## 2025-04-30 RX ORDER — METHYLPREDNISOLONE 4 MG/1
TABLET ORAL
Qty: 21 TABLET | Refills: 0 | Status: SHIPPED | OUTPATIENT
Start: 2025-04-30

## 2025-04-30 RX ADMIN — DIPHENHYDRAMINE HYDROCHLORIDE 25 MG: 50 INJECTION INTRAMUSCULAR; INTRAVENOUS at 16:09

## 2025-04-30 RX ADMIN — DEXAMETHASONE SODIUM PHOSPHATE 10 MG: 10 INJECTION INTRAMUSCULAR; INTRAVENOUS at 16:09

## 2025-04-30 RX ADMIN — TETRACAINE HYDROCHLORIDE 2 DROP: 5 SOLUTION OPHTHALMIC at 17:27

## 2025-04-30 NOTE — ED PROVIDER NOTES
Subjective  History of Present Illness:    Chief Complaint: Allergic reaction  History of Present Illness: 34-year-old male presents as a allergic reaction, he states he has been dealing with a rash on his face and around his eyes as well as his neck since the beginning of February.  The rash itching and swelling have been intermittent for several months.  He initially tried to make changes to his routine including soaps and products that he was using, he eventually was seen by his primary care physician and a dermatologist.  He had a recent patch testing, that showed that he was allergic to several chemical products, and has to avoid a lot of over-the-counter soaps and fragrances.  He has also been on steroids recently to help with the rash and itching.  He states that the rash around his eyes has been intermittent, but today it has really bad, causing his eyes to feel like he has a film over them and they feel heavy.  He is also taking over-the-counter Benadryl intermittently as well as other allergy medicine.   Onset: Gradual  Duration: Several months  Exacerbating / Alleviating factors: Patient has been on steroids, had a recent patch test, is allergic to multiple fragrances and chemicals  Associated symptoms: Burning in eyes      Nurses Notes reviewed and agree, including vitals, allergies, social history and prior medical history.     REVIEW OF SYSTEMS: All systems reviewed and not pertinent unless noted.    Review of Systems   Eyes:  Positive for pain, redness and itching.   Skin:  Positive for rash.   All other systems reviewed and are negative.      Past Medical History:   Diagnosis Date    Anemia     low iron     Asthma        Allergies:    Patient has no known allergies.      No past surgical history on file.      Social History     Socioeconomic History    Marital status: Single   Tobacco Use    Smoking status: Never     Passive exposure: Never    Smokeless tobacco: Never   Vaping Use    Vaping status:  "Never Used   Substance and Sexual Activity    Alcohol use: Not Currently     Alcohol/week: 4.0 standard drinks of alcohol     Types: 2 Cans of beer, 2 Shots of liquor per week     Comment: Beer 2-3 times a year    Drug use: Never    Sexual activity: Yes     Partners: Female     Birth control/protection: Condom, I.U.D.         Family History   Problem Relation Age of Onset    Arthritis Maternal Grandmother     Cancer Maternal Grandmother     Diabetes Maternal Grandmother     Arthritis Paternal Grandmother     Cancer Paternal Grandmother     Diabetes Paternal Grandmother     Breast cancer Neg Hx     Heart attack Neg Hx     Hypertension Neg Hx     Migraines Neg Hx     Stroke Neg Hx        Objective  Physical Exam:  /65   Pulse 69   Temp 98.2 °F (36.8 °C) (Oral)   Resp 16   Ht 182.9 cm (72\")   Wt 75.3 kg (166 lb)   SpO2 96%   BMI 22.51 kg/m²      Physical Exam  Vitals and nursing note reviewed.   Constitutional:       Appearance: He is well-developed.   HENT:      Head: Normocephalic and atraumatic.        Comments: Erythematous bright pink to red rash surrounding bilateral eyes and anterior neck     Mouth/Throat:      Mouth: Mucous membranes are moist.   Eyes:      Extraocular Movements: Extraocular movements intact.   Cardiovascular:      Rate and Rhythm: Normal rate and regular rhythm.   Pulmonary:      Effort: Pulmonary effort is normal.      Breath sounds: Normal breath sounds.   Abdominal:      Palpations: Abdomen is soft.   Musculoskeletal:         General: Normal range of motion.   Skin:     General: Skin is warm and dry.   Neurological:      Mental Status: He is alert and oriented to person, place, and time.   Psychiatric:         Behavior: Behavior normal.         Thought Content: Thought content normal.         Judgment: Judgment normal.           Procedures    ED Course:    ED Course as of 04/30/25 1930 Wed Apr 30, 2025   1717 Procedure note: Fluorescein and tetracaine applied to bilateral " eyes, visualization under Woods lamp, no acute corneal abrasion [CS]      ED Course User Index  [CS] Javan Marin Jr., PA-C       Lab Results (last 24 hours)       Procedure Component Value Units Date/Time    CBC Auto Differential [[id number]]  (Normal) Collected: 04/30/25 1604    Specimen: Blood Updated: 04/30/25 1622     WBC 5.63 10*3/mm3      RBC 5.00 10*6/mm3      Hemoglobin 14.1 g/dL      Hematocrit 42.6 %      MCV 85.2 fL      MCH 28.2 pg      MCHC 33.1 g/dL      RDW 13.0 %      RDW-SD 40.0 fl      MPV 10.4 fL      Platelets 169 10*3/mm3      Neutrophil % 64.7 %      Lymphocyte % 23.4 %      Monocyte % 8.7 %      Eosinophil % 2.1 %      Basophil % 0.7 %      Immature Grans % 0.4 %      Neutrophils, Absolute 3.64 10*3/mm3      Lymphocytes, Absolute 1.32 10*3/mm3      Monocytes, Absolute 0.49 10*3/mm3      Eosinophils, Absolute 0.12 10*3/mm3      Basophils, Absolute 0.04 10*3/mm3      Immature Grans, Absolute 0.02 10*3/mm3      nRBC 0.0 /100 WBC     Comprehensive Metabolic Panel [[id number]] Collected: 04/30/25 1604    Specimen: Blood Updated: 04/30/25 1642     Glucose 90 mg/dL      BUN 12 mg/dL      Creatinine 0.89 mg/dL      Sodium 140 mmol/L      Potassium 3.8 mmol/L      Chloride 103 mmol/L      CO2 27.0 mmol/L      Calcium 9.2 mg/dL      Total Protein 6.6 g/dL      Albumin 4.1 g/dL      ALT (SGPT) 14 U/L      AST (SGOT) 23 U/L      Alkaline Phosphatase 85 U/L      Total Bilirubin 0.8 mg/dL      Globulin 2.5 gm/dL      Comment: Calculated Result        A/G Ratio 1.6 g/dL      BUN/Creatinine Ratio 13.5     Anion Gap 10.0 mmol/L      eGFR 116.8 mL/min/1.73     Narrative:      GFR Categories in Chronic Kidney Disease (CKD)      GFR Category          GFR (mL/min/1.73)    Interpretation  G1                     90 or greater         Normal or high (1)  G2                      60-89                Mild decrease (1)  G3a                   45-59                Mild to moderate decrease  G3b                   30-44                 Moderate to severe decrease  G4                    15-29                Severe decrease  G5                    14 or less           Kidney failure          (1)In the absence of evidence of kidney disease, neither GFR category G1 or G2 fulfill the criteria for CKD.    eGFR calculation 2021 CKD-EPI creatinine equation, which does not include race as a factor             No radiology results from the last 24 hrs                                                                  Medical Decision Making  Problems Addressed:  Allergic reaction, initial encounter: complicated acute illness or injury  Facial rash: complicated acute illness or injury  Hives: complicated acute illness or injury    Amount and/or Complexity of Data Reviewed  External Data Reviewed: labs and notes.  Labs: ordered. Decision-making details documented in ED Course.    Risk  Prescription drug management.          Final diagnoses:   Allergic reaction, initial encounter   Hives   Facial rash           Disposition DISCHARGE    Patient discharged in stable condition.    Reviewed implications of results, diagnosis, meds, responsibility to follow up, warning signs and symptoms of possible worsening, potential complications and reasons to return to ER.    Patient/Family voiced understanding of above instructions.    Discussed plan for discharge, as there is no emergent indication for admission.  Pt/family is agreeable and understands need for follow up and possible repeat testing.  Pt/family is aware that discharge does not mean that nothing is wrong but that it indicates no emergency is currently present that requires admission and they must continue care with follow-up as given below or with a physician of their choice.     FOLLOW-UP  [facility] EMERGENCY DEPARTMENT  [address]  [facility] [address] [phone]    If symptoms worsen         Medication List        New Prescriptions      methylPREDNISolone 4  MG dose pack  Commonly known as: MEDROL  Take as directed on package instructions.               Where to Get Your Medications        These medications were sent to [facility]/pharmacy #[id number] - [place], [place] - [address] - [phone] [facility] [phone]   [address] [place] [address]      Phone: [phone]   methylPREDNISolone 4 MG dose pack             Javan Marin Jr., PA-C  04/30/25 1938

## 2025-05-16 ENCOUNTER — OFFICE VISIT (OUTPATIENT)
Age: 35
End: 2025-05-16
Payer: COMMERCIAL

## 2025-05-16 VITALS
OXYGEN SATURATION: 97 % | SYSTOLIC BLOOD PRESSURE: 122 MMHG | WEIGHT: 170.8 LBS | HEART RATE: 76 BPM | BODY MASS INDEX: 23.13 KG/M2 | DIASTOLIC BLOOD PRESSURE: 60 MMHG | HEIGHT: 72 IN

## 2025-05-16 DIAGNOSIS — L50.8 CHRONIC URTICARIA: Primary | ICD-10-CM

## 2025-05-16 RX ORDER — FEXOFENADINE HCL 180 MG/1
180 TABLET ORAL 2 TIMES DAILY
Qty: 180 TABLET | Refills: 2 | Status: SHIPPED | OUTPATIENT
Start: 2025-05-16

## 2025-05-16 RX ORDER — CETIRIZINE HYDROCHLORIDE 10 MG/1
10 TABLET ORAL DAILY
COMMUNITY
End: 2025-05-16

## 2025-05-16 RX ORDER — EPINEPHRINE 0.3 MG/.3ML
0.3 INJECTION SUBCUTANEOUS ONCE
Qty: 2 EACH | Refills: 0 | Status: SHIPPED | OUTPATIENT
Start: 2025-05-16 | End: 2025-05-16

## 2025-05-16 RX ORDER — FEXOFENADINE HCL 180 MG/1
180 TABLET ORAL DAILY
COMMUNITY
End: 2025-05-16 | Stop reason: SDUPTHER

## 2025-05-18 NOTE — PROGRESS NOTES
Office Note     Name: Sonny Goel    : 1990     MRN: [id number]     Chief Complaint  Facial Swelling (Eye swelling)    Subjective     History of Present Illness:  Sonny Goel is a 34 y.o. male who presents today for follow up on chronic medical conditions. He has followed the recommendation of his dermatologist and completely eliminated fragrance but has had swelling around his eyes and lips.  He has been to the ER and improved with steroids and antihistamines.  He is not currently having lip swelling.  His itching on his hands improved but his other symptoms have not. He does not recall being told about environmental allergies but did have patch testing done.     Past Medical History:   Past Medical History:   Diagnosis Date   • Anemia     low iron    • Asthma        Past Surgical History: History reviewed. No pertinent surgical history.    Immunizations:   Immunization History   Administered Date(s) Administered   • COVID-19 (PFIZER) Purple Cap Monovalent 2021, 2021   • Hep A, 2 Dose 2009, 2010   • Tdap 2025   • Varicella 2009        Medications:     Current Outpatient Medications:   •  fexofenadine (ALLEGRA) 180 MG tablet, Take 1 tablet by mouth 2 (Two) Times a Day., Disp: 180 tablet, Rfl: 2  •  hydrocortisone 2.5 % ointment, Apply 1 Application topically to the appropriate area as directed 2 (Two) Times a Day., Disp: , Rfl:   •  triamcinolone (KENALOG) 0.1 % ointment, Apply 1 Application topically to the appropriate area as directed 2 (Two) Times a Day., Disp: , Rfl:   •  methylPREDNISolone (MEDROL) 4 MG dose pack, Take as directed on package instructions. (Patient not taking: Reported on 2025), Disp: 21 tablet, Rfl: 0  •  Multiple Vitamins-Minerals (MULTIVITAMIN GUMMIES ADULT PO), Take 2 each by mouth Daily. (Patient not taking: Reported on 3/28/2025), Disp: , Rfl:   •  predniSONE (DELTASONE) 20 MG tablet, 2 pills daily for 5 days  "(Patient not taking: Reported on 3/28/2025), Disp: 10 tablet, Rfl: 0    Allergies:   Allergies   Allergen Reactions   • 2,4-D Dimethylamine Swelling   • Cobalt Swelling   • Dimethylaminoethanol P-Acetamidobenzoate Swelling       Family History:   Family History   Problem Relation Age of Onset   • Arthritis Maternal Grandmother    • Cancer Maternal Grandmother    • Diabetes Maternal Grandmother    • Arthritis Paternal Grandmother    • Cancer Paternal Grandmother    • Diabetes Paternal Grandmother    • Breast cancer Neg Hx    • Heart attack Neg Hx    • Hypertension Neg Hx    • Migraines Neg Hx    • Stroke Neg Hx        Social History:   Social History     Socioeconomic History   • Marital status: Single   Tobacco Use   • Smoking status: Never     Passive exposure: Never   • Smokeless tobacco: Never   Vaping Use   • Vaping status: Never Used   Substance and Sexual Activity   • Alcohol use: Not Currently     Alcohol/week: 4.0 standard drinks of alcohol     Types: 2 Cans of beer, 2 Shots of liquor per week     Comment: Beer 2-3 times a year   • Drug use: Never   • Sexual activity: Yes     Partners: Female     Birth control/protection: Condom, I.U.D.         Objective     Vital Signs  /60 (BP Location: Left arm, Patient Position: Sitting, Cuff Size: Adult)   Pulse 76   Ht 182.9 cm (72.01\")   Wt 77.5 kg (170 lb 12.8 oz)   SpO2 97%   BMI 23.16 kg/m²   Estimated body mass index is 23.16 kg/m² as calculated from the following:    Height as of this encounter: 182.9 cm (72.01\").    Weight as of this encounter: 77.5 kg (170 lb 12.8 oz).    BMI is within normal parameters. No other follow-up for BMI required.      Physical Exam  Constitutional:       General: He is not in acute distress.     Appearance: He is not toxic-appearing.   Pulmonary:      Effort: Pulmonary effort is normal. No respiratory distress.   Abdominal:      General: Abdomen is flat. There is no distension.   Skin:     General: Skin is warm and dry. "   Neurological:      Mental Status: He is alert.   Psychiatric:         Mood and Affect: Mood normal.         Behavior: Behavior normal.          Assessment and Plan     1. Chronic urticaria  Chronic uncontrolled  Rx epi pen due to angioedema symptoms, counseled on when to go to the ER  Rx bid allegra  Refer to allergy at [facility]     A total time of 45 minutes was spent on this visit with >50% of that time spent face to face counseling, obtaining history, performing exam    Counseling was given to patient for the following topics: instructions for management.    Follow Up  No follow-ups on file.    MD VICENTE [facility]man[facility]  [address] [address]  [phone]

## 2025-08-01 ENCOUNTER — LAB (OUTPATIENT)
Age: 35
End: 2025-08-01
Payer: COMMERCIAL

## 2025-08-01 ENCOUNTER — OFFICE VISIT (OUTPATIENT)
Age: 35
End: 2025-08-01
Payer: COMMERCIAL

## 2025-08-01 VITALS
HEIGHT: 72 IN | BODY MASS INDEX: 23.68 KG/M2 | SYSTOLIC BLOOD PRESSURE: 110 MMHG | OXYGEN SATURATION: 96 % | HEART RATE: 78 BPM | WEIGHT: 174.8 LBS | DIASTOLIC BLOOD PRESSURE: 58 MMHG

## 2025-08-01 DIAGNOSIS — L50.8 CHRONIC URTICARIA: ICD-10-CM

## 2025-08-01 DIAGNOSIS — Z00.00 HEALTHCARE MAINTENANCE: Primary | ICD-10-CM

## 2025-08-01 DIAGNOSIS — Z00.00 HEALTHCARE MAINTENANCE: ICD-10-CM

## 2025-08-01 DIAGNOSIS — R76.8 POSITIVE ANA (ANTINUCLEAR ANTIBODY): ICD-10-CM

## 2025-08-01 LAB
BASOPHILS # BLD AUTO: 0.05 10*3/MM3 (ref 0–0.2)
BASOPHILS NFR BLD AUTO: 0.8 % (ref 0–1.5)
CHOLEST SERPL-MCNC: 179 MG/DL (ref 0–200)
CRP SERPL-MCNC: <0.3 MG/DL (ref 0–0.5)
DEPRECATED RDW RBC AUTO: 44.3 FL (ref 37–54)
EOSINOPHIL # BLD AUTO: 0.18 10*3/MM3 (ref 0–0.4)
EOSINOPHIL NFR BLD AUTO: 2.7 % (ref 0.3–6.2)
ERYTHROCYTE [DISTWIDTH] IN BLOOD BY AUTOMATED COUNT: 14.4 % (ref 12.3–15.4)
HCT VFR BLD AUTO: 43.3 % (ref 37.5–51)
HDLC SERPL-MCNC: 35 MG/DL (ref 40–60)
HGB BLD-MCNC: 14.7 G/DL (ref 13–17.7)
IMM GRANULOCYTES # BLD AUTO: 0.03 10*3/MM3 (ref 0–0.05)
IMM GRANULOCYTES NFR BLD AUTO: 0.5 % (ref 0–0.5)
LDLC SERPL CALC-MCNC: 123 MG/DL (ref 0–100)
LDLC/HDLC SERPL: 3.45 {RATIO}
LYMPHOCYTES # BLD AUTO: 1.8 10*3/MM3 (ref 0.7–3.1)
LYMPHOCYTES NFR BLD AUTO: 27.4 % (ref 19.6–45.3)
MCH RBC QN AUTO: 28.9 PG (ref 26.6–33)
MCHC RBC AUTO-ENTMCNC: 33.9 G/DL (ref 31.5–35.7)
MCV RBC AUTO: 85.1 FL (ref 79–97)
MONOCYTES # BLD AUTO: 0.59 10*3/MM3 (ref 0.1–0.9)
MONOCYTES NFR BLD AUTO: 9 % (ref 5–12)
NEUTROPHILS NFR BLD AUTO: 3.92 10*3/MM3 (ref 1.7–7)
NEUTROPHILS NFR BLD AUTO: 59.6 % (ref 42.7–76)
NRBC BLD AUTO-RTO: 0 /100 WBC (ref 0–0.2)
PLATELET # BLD AUTO: 200 10*3/MM3 (ref 140–450)
PMV BLD AUTO: 10.8 FL (ref 6–12)
RBC # BLD AUTO: 5.09 10*6/MM3 (ref 4.14–5.8)
TRIGL SERPL-MCNC: 117 MG/DL (ref 0–150)
VLDLC SERPL-MCNC: 21 MG/DL (ref 5–40)
WBC NRBC COR # BLD AUTO: 6.57 10*3/MM3 (ref 3.4–10.8)

## 2025-08-01 PROCEDURE — 36415 COLL VENOUS BLD VENIPUNCTURE: CPT | Performed by: STUDENT IN AN ORGANIZED HEALTH CARE EDUCATION/TRAINING PROGRAM

## 2025-08-01 PROCEDURE — 99395 PREV VISIT EST AGE 18-39: CPT | Performed by: STUDENT IN AN ORGANIZED HEALTH CARE EDUCATION/TRAINING PROGRAM

## 2025-08-01 PROCEDURE — 85025 COMPLETE CBC W/AUTO DIFF WBC: CPT | Performed by: STUDENT IN AN ORGANIZED HEALTH CARE EDUCATION/TRAINING PROGRAM

## 2025-08-01 PROCEDURE — 80061 LIPID PANEL: CPT | Performed by: STUDENT IN AN ORGANIZED HEALTH CARE EDUCATION/TRAINING PROGRAM

## 2025-08-01 PROCEDURE — 86140 C-REACTIVE PROTEIN: CPT | Performed by: STUDENT IN AN ORGANIZED HEALTH CARE EDUCATION/TRAINING PROGRAM

## 2025-08-01 NOTE — PROGRESS NOTES
Office Note     Name: Sonny Goel    : 1990     MRN: [id number]     Chief Complaint  Annual Exam    Subjective     History of Present Illness:  Sonny Goel is a 34 y.o. male who presents today for annual health maintenance exam.  He has been doing fairly well.  He has noted good benefit with the allegra but correlates his symptoms with heat. He did take a 1 week break from allegra before his symptoms recurred.  No other new developments.      Past Medical History:   Past Medical History:   Diagnosis Date    Allergic     Anemia     low iron     Asthma        Past Surgical History: History reviewed. No pertinent surgical history.    Immunizations:   Immunization History   Administered Date(s) Administered    COVID-19 (PFIZER) Purple Cap Monovalent 2021, 2021    Hep A, 2 Dose 2009, 2010    Tdap 2025    Varicella 2009        Medications:     Current Outpatient Medications:     fexofenadine (ALLEGRA) 180 MG tablet, Take 1 tablet by mouth 2 (Two) Times a Day., Disp: 180 tablet, Rfl: 2    Allergies:   Allergies   Allergen Reactions    2,4-D Dimethylamine Swelling    Cobalt Swelling    Dimethylaminoethanol P-Acetamidobenzoate Swelling       Family History:   Family History   Problem Relation Age of Onset    Arthritis Maternal Grandmother     Cancer Maternal Grandmother     Diabetes Maternal Grandmother     Arthritis Paternal Grandmother     Cancer Paternal Grandmother     Diabetes Paternal Grandmother     Arthritis Mother     Cancer Mother     Breast cancer Neg Hx     Heart attack Neg Hx     Hypertension Neg Hx     Migraines Neg Hx     Stroke Neg Hx        Social History:   Social History     Socioeconomic History    Marital status: Single   Tobacco Use    Smoking status: Never     Passive exposure: Never    Smokeless tobacco: Never   Vaping Use    Vaping status: Never Used   Substance and Sexual Activity    Alcohol use: Yes     Alcohol/week: 1.0 standard  "drink of alcohol     Types: 1 Cans of beer per week     Comment: Last beer 7/20/2024    Drug use: Never    Sexual activity: Yes     Partners: Female     Birth control/protection: Condom, I.U.D.         Objective     Vital Signs  /58 (BP Location: Right arm, Patient Position: Sitting, Cuff Size: Adult)   Pulse 78   Ht 182.9 cm (72.01\")   Wt 79.3 kg (174 lb 12.8 oz)   SpO2 96%   BMI 23.70 kg/m²   Estimated body mass index is 23.7 kg/m² as calculated from the following:    Height as of this encounter: 182.9 cm (72.01\").    Weight as of this encounter: 79.3 kg (174 lb 12.8 oz).    BMI is within normal parameters. No other follow-up for BMI required.      Physical Exam  Constitutional:       General: He is not in acute distress.     Appearance: He is not toxic-appearing.   Cardiovascular:      Rate and Rhythm: Normal rate and regular rhythm.      Heart sounds: No murmur heard.     No friction rub. No gallop.   Pulmonary:      Effort: Pulmonary effort is normal.      Breath sounds: Normal breath sounds.   Abdominal:      General: Abdomen is flat. There is no distension.   Skin:     General: Skin is warm and dry.   Neurological:      Mental Status: He is alert.   Psychiatric:         Mood and Affect: Mood normal.         Behavior: Behavior normal.          Assessment and Plan     1. Healthcare maintenance  Check labs  No indication for cancer screening at this time  - CBC Auto Differential; Future  - Lipid Panel; Future  - C-reactive protein; Future    2. Chronic urticaria  Chronic stable  Continue allegra BID.  Trial off of this in the winter to get a better idea of triggers     3. Positive RONEY (antinuclear antibody)  Rheum referral pending    Has seb derm trying fluocinonide, will try ketoconazole shampoo if it fails       Counseling was given to patient for the following topics: instructions for management.    Follow Up  Return in about 5 months (around 1/1/2026).    Sven Coelho MD  [facility] " [facility] PRIMARY [facility]  [address] [address]  [phone]